# Patient Record
Sex: FEMALE | URBAN - NONMETROPOLITAN AREA
[De-identification: names, ages, dates, MRNs, and addresses within clinical notes are randomized per-mention and may not be internally consistent; named-entity substitution may affect disease eponyms.]

---

## 2020-10-18 ENCOUNTER — HISTORICAL (OUTPATIENT)
Dept: ADMINISTRATIVE | Facility: HOSPITAL | Age: 20
End: 2020-10-18

## 2020-11-05 LAB
BILIRUB SERPL-MCNC: NORMAL MG/DL
BLOOD URINE, POC: NORMAL
CLARITY, POC UA: NORMAL
COLOR, POC UA: NORMAL
GLUCOSE UR QL STRIP: NEGATIVE
KETONES UR QL STRIP: NORMAL
LEUKOCYTE EST, POC UA: NEGATIVE
NITRITE, POC UA: NEGATIVE
PH, POC UA: 6
POC BETA-HCG (QUAL): NEGATIVE
PROTEIN, POC: NEGATIVE
SPECIFIC GRAVITY, POC UA: NORMAL
UROBILINOGEN, POC UA: NORMAL

## 2021-04-12 ENCOUNTER — HISTORICAL (OUTPATIENT)
Dept: ADMINISTRATIVE | Facility: HOSPITAL | Age: 21
End: 2021-04-12

## 2022-04-11 ENCOUNTER — HISTORICAL (OUTPATIENT)
Dept: ADMINISTRATIVE | Facility: HOSPITAL | Age: 22
End: 2022-04-11

## 2022-04-27 VITALS
WEIGHT: 202.81 LBS | HEIGHT: 68 IN | BODY MASS INDEX: 30.74 KG/M2 | DIASTOLIC BLOOD PRESSURE: 80 MMHG | SYSTOLIC BLOOD PRESSURE: 122 MMHG

## 2022-09-22 ENCOUNTER — HISTORICAL (OUTPATIENT)
Dept: ADMINISTRATIVE | Facility: HOSPITAL | Age: 22
End: 2022-09-22

## 2024-07-17 ENCOUNTER — OFFICE VISIT (OUTPATIENT)
Dept: OBSTETRICS AND GYNECOLOGY | Facility: CLINIC | Age: 24
End: 2024-07-17
Payer: MEDICAID

## 2024-07-17 VITALS
SYSTOLIC BLOOD PRESSURE: 120 MMHG | WEIGHT: 211.88 LBS | DIASTOLIC BLOOD PRESSURE: 68 MMHG | BODY MASS INDEX: 33.26 KG/M2 | HEIGHT: 67 IN

## 2024-07-17 DIAGNOSIS — Z3A.24 24 WEEKS GESTATION OF PREGNANCY: ICD-10-CM

## 2024-07-17 DIAGNOSIS — Z12.4 CERVICAL CANCER SCREENING: ICD-10-CM

## 2024-07-17 DIAGNOSIS — Z11.3 ROUTINE SCREENING FOR STI (SEXUALLY TRANSMITTED INFECTION): ICD-10-CM

## 2024-07-17 DIAGNOSIS — Z34.00 INITIAL OBSTETRIC VISIT, ANTEPARTUM: Primary | ICD-10-CM

## 2024-07-17 DIAGNOSIS — O99.210 OBESITY AFFECTING PREGNANCY, ANTEPARTUM, UNSPECIFIED OBESITY TYPE: ICD-10-CM

## 2024-07-17 PROCEDURE — 87591 N.GONORRHOEAE DNA AMP PROB: CPT | Performed by: OBSTETRICS & GYNECOLOGY

## 2024-07-17 PROCEDURE — 87661 TRICHOMONAS VAGINALIS AMPLIF: CPT | Performed by: OBSTETRICS & GYNECOLOGY

## 2024-07-17 PROCEDURE — 87491 CHLMYD TRACH DNA AMP PROBE: CPT | Performed by: OBSTETRICS & GYNECOLOGY

## 2024-07-17 NOTE — PROGRESS NOTES
Chief Complaint:  UPT confirmation (LMP Unknown, states she is 26 wks. [facility] [place], States she forgot paperwork at home. )    History of Present Illness:  Anastasiia Altman is a 24 y.o. year old  presents for her new ob, transferring from [place].  Patient says she was seen at a [facility] in [place] but no labs were performed during her prenatal care.  Reports was told YANELI 24 then changed, records not available today. Denies complications thus far.     Gyn History:    Menstrual History   Cycle: Yes  Menarche Age: 13 years  Flow Duration: 7  Flow: (!) Heavy  Interval: 28  Intermenstrual Bleeding: No  Dysmenorrhea: Yes  Dysmenorrhea Severity : Mild  [place]  Sexually Active: Yes  Sexual Orientation: heterosexual  Postcoital Bleeding: No  Dyspareunia: No  STI History: Yes  STI Type: Chlamydia  Contraception: No  Menopause  Menopause Age: 1 years  Breast History  Last Breast Imaging Date: No  History of Breast Biopsy: No  Pap History   Last pap date:  (PAP @ [facility] PAP @ age 23)  Result: Normal  History of Abnormal Pap: No  HPV Vaccine Completed: Yes  HPV Vaccine Injection Type: 3 Injection Series        Review of Systems:  General/Constitutional: Chills denies. Fatigue/weakness denies. Fever denies. Night sweats denies. Hot flashes denies.   Respiratory: Cough denies. Hemoptysis denies. SOB denies. Sputum production denies. Wheezing denies.   Cardiovascular: Chest pain denies. Dizziness denies. Palpitations denies. Swelling in hands/feet denies.    Gastrointestinal: Abdominal pain denies. Blood in stool denies. Constipation denies. Diarrhea denies. Heartburn denies. Nausea denies. Vomiting denies.   Genitourinary: Incontinence denies. Blood in urine denies. Frequent urination denies. Painful urination denies.  Urinary urgency denies.  Nocturia denies.   Gynecologic: Irregular menses denies. Heavy bleeding denies. Painful menses denies. Vaginal discharge denies. Vaginal  "odor denies. Vaginal itching denies. Vaginal lesion denies.  Pelvic pain denies. Decreased libido denies. Vulvar lesion denies. Prolapse of genital organs denies. Painful intercourse denies. Postcoital bleeding denies.   Psychiatric: Depression denies. Anxiety denies.     OB History    Para Term  AB Living   1 0 0 0 0 0   SAB IAB Ectopic Multiple Live Births   0 0 0 0 0      # 1 - Date: None, Sex: None, Weight: None, GA: None, Type: None, Apgar1: None, Apgar5: None, Living: None, Birth Comments: None      History reviewed. No pertinent past medical history.  No current outpatient medications on file.    Review of patient's allergies indicates:   Allergen Reactions    Iodine Nausea And Vomiting    Penicillin Nausea And Vomiting       History reviewed. No pertinent surgical history.  Family History   Problem Relation Name Age of Onset    Ovarian cancer Paternal Aunt      Breast cancer Paternal Aunt      Uterine cancer Neg Hx      Cervical cancer Neg Hx      Colon cancer Neg Hx       Social History     Socioeconomic History    Marital status: Single   Tobacco Use    Smoking status: Some Days     Types: Vaping with nicotine    Smokeless tobacco: Never   Substance and Sexual Activity    Alcohol use: Not Currently    Drug use: Not Currently     Types: Marijuana    Sexual activity: Yes     Partners: Male     Birth control/protection: None       Physical Exam:  /68   Ht 5' 7" (1.702 m)   Wt 96.1 kg (211 lb 13.8 oz)   BMI 33.18 kg/m²     General Exam:    General Appearance: alert, in no acute distress, normal, well nourished.  Psych:  Orientation: time, place, person.  Mood/Affect: Normal         OB US:   BPD: 23w4d  HC: 24w2d  AC: 23w5d  FL: 25w3d  EFW: 686g, 15%tile    BPP:   BEBETO: 10.9    YANELI 24 by today's US      Assessment/Plan:  1. Initial obstetric visit, antepartum  -     US OB/GYN Procedure ([facility]) - Extended List - Future; Future    2. 24 weeks gestation of pregnancy  -     US " OB/GYN Procedure ([facility]) - Extended List - Future; Future  -     Cell-Free DNA Prenatal Screen; Future; Expected date: 07/17/2024  -     US OB 14+ Wks, TransAbd, Single Gestation; Future; Expected date: 07/18/2024  -     GTT 1 Hour; Future; Expected date: 07/17/2024  -     Hemoglobin Electrophoresis Evaluation, Blood; Future; Expected date: 07/17/2024  -     Rubella Antibody, IgG; Future; Expected date: 07/17/2024  -     Varicella Zoster Antibody, IgG; Future; Expected date: 07/17/2024  -     HIV 1/2 Ag/Ab (4th Gen); Future; Expected date: 07/17/2024  -     SYPHILIS ANTIBODY (WITH REFLEX RPR); Future; Expected date: 07/17/2024  -     Hepatitis C Antibody; Future; Expected date: 07/17/2024  -     Hepatitis B Surface Antigen; Future; Expected date: 07/17/2024  -     Type & Screen; Future; Expected date: 07/17/2024  -     CBC Auto Differential; Future; Expected date: 07/17/2024    3. Cervical cancer screening  -     Liquid-Based Pap Smear, Screening    4. Routine screening for STI (sexually transmitted infection)  -     Liquid-Based Pap Smear, Screening    5. Obesity affecting pregnancy, antepartum, unspecified obesity type  -     GTT 1 Hour; Future; Expected date: 07/17/2024           Prenatal counseling  Discussed appropriate weight gain for pregnancy  Tobacco avoidance/cessation  Illicit drug avoidance  PNL  PNV, folic acid     GC/CZ/TV     VALDO for records     O'KENNEDY   ANATOMY SCAN   CFDNA, CF     RTC 4 weeks.      This note was transcribed by Snow Amaya. There may be transcription errors as a result, however minimal. Effort has been made to ensure accuracy of transcription, but any obvious errors or omissions should be clarified with the author of the document.

## 2024-07-18 PROBLEM — O99.210 OBESITY AFFECTING PREGNANCY, ANTEPARTUM: Status: ACTIVE | Noted: 2024-07-18

## 2024-07-19 ENCOUNTER — HOSPITAL ENCOUNTER (OUTPATIENT)
Dept: RADIOLOGY | Facility: HOSPITAL | Age: 24
Discharge: HOME | End: 2024-07-19
Attending: OBSTETRICS & GYNECOLOGY
Payer: MEDICAID

## 2024-07-19 DIAGNOSIS — Z3A.24 24 WEEKS GESTATION OF PREGNANCY: ICD-10-CM

## 2024-07-19 PROCEDURE — 76805 OB US >/= 14 WKS SNGL FETUS: CPT | Mod: TC

## 2024-07-22 ENCOUNTER — TELEPHONE (OUTPATIENT)
Dept: OBSTETRICS AND GYNECOLOGY | Facility: CLINIC | Age: 24
End: 2024-07-22
Payer: MEDICAID

## 2024-07-22 NOTE — TELEPHONE ENCOUNTER
Sent in referral to Dr Mcdonald for early nuchal cord noted on anatomy scan. Called and informed pt about the referral being sent and for pt to be expecting a call from them to schedule appt. Pt verbalized understanding

## 2024-07-22 NOTE — TELEPHONE ENCOUNTER
----- Message from Ronaldo Roper MD sent at 7/22/2024  9:23 AM CDT -----  Refer to MFM for early nuchal cord noted on anatomy US

## 2024-07-23 ENCOUNTER — TELEPHONE (OUTPATIENT)
Dept: OBSTETRICS AND GYNECOLOGY | Facility: CLINIC | Age: 24
End: 2024-07-23
Payer: MEDICAID

## 2024-07-23 ENCOUNTER — TELEPHONE (OUTPATIENT)
Dept: MATERNAL FETAL MEDICINE | Facility: CLINIC | Age: 24
End: 2024-07-23
Payer: MEDICAID

## 2024-07-23 LAB
CHLAMYDIA TRACHOMATIS: NEGATIVE
NEISSERIA GONORRHOEAE: NEGATIVE
PSYCHE PATHOLOGY RESULT: NORMAL
TRICHOMONAS VAGINALIS: POSITIVE

## 2024-07-23 NOTE — TELEPHONE ENCOUNTER
----- Message from Brylee Guillory sent at 7/23/2024  2:40 PM CDT -----  Regarding: Pt Advice  Contact: Dr. Mcdonald Office  Dr. Mcdonald office called stating that they called the patient to let her know that they received a referral for her. The office said that they patient started hysterically crying over the phone stating that she has no idea why she is having to see them and that she thought her baby was healthy. Patient call back number [phone]

## 2024-07-23 NOTE — TELEPHONE ENCOUNTER
Placed call to pt to notify referral was sent for early nuchal cord noted on anatomy US. Informed pt that referral is in system and MFM should be reaching out soon to schedule appt. Pt may reach out to MFM regarding appt if desires. Questions answered to the best of my ability. Offered appt for questions/concerns with our office, to call if desires. Voiced understanding.

## 2024-07-25 ENCOUNTER — TELEPHONE (OUTPATIENT)
Dept: OBSTETRICS AND GYNECOLOGY | Facility: CLINIC | Age: 24
End: 2024-07-25
Payer: MEDICAID

## 2024-07-25 DIAGNOSIS — A59.9 TRICHOMONAS INFECTION: Primary | ICD-10-CM

## 2024-07-25 RX ORDER — METRONIDAZOLE 500 MG/1
500 TABLET ORAL 2 TIMES DAILY
Qty: 14 TABLET | Refills: 0 | Status: SHIPPED | OUTPATIENT
Start: 2024-07-25 | End: 2024-08-01

## 2024-07-25 NOTE — TELEPHONE ENCOUNTER
Spoke with pt and informed of +TV results and per Dr Jacquelin Stiles to be sent to pharmacy. Pt have an appt on 8/19/2023 and NED can be done then. Pt also states that we can treat her partner as well. I informed pt that I will call in pt prescription to [facility] [address] in [place]. Pt verbalized understanding

## 2024-07-29 DIAGNOSIS — R93.89 ABNORMAL ULTRASOUND: ICD-10-CM

## 2024-07-29 DIAGNOSIS — Z36.89 ENCOUNTER FOR FETAL ANATOMIC SURVEY: ICD-10-CM

## 2024-07-31 ENCOUNTER — PROCEDURE VISIT (OUTPATIENT)
Dept: MATERNAL FETAL MEDICINE | Facility: CLINIC | Age: 24
End: 2024-07-31
Payer: MEDICAID

## 2024-07-31 ENCOUNTER — OFFICE VISIT (OUTPATIENT)
Dept: MATERNAL FETAL MEDICINE | Facility: CLINIC | Age: 24
End: 2024-07-31
Payer: MEDICAID

## 2024-07-31 VITALS
DIASTOLIC BLOOD PRESSURE: 75 MMHG | WEIGHT: 217.81 LBS | SYSTOLIC BLOOD PRESSURE: 118 MMHG | HEART RATE: 97 BPM | BODY MASS INDEX: 34.19 KG/M2 | HEIGHT: 67 IN

## 2024-07-31 DIAGNOSIS — O28.3 ABNORMAL PRENATAL ULTRASOUND: ICD-10-CM

## 2024-07-31 DIAGNOSIS — R93.89 ABNORMAL ULTRASOUND: ICD-10-CM

## 2024-07-31 DIAGNOSIS — Z36.89 ENCOUNTER FOR FETAL ANATOMIC SURVEY: ICD-10-CM

## 2024-07-31 DIAGNOSIS — O09.90 AT HIGH RISK FOR COMPLICATIONS OF INTRAUTERINE PREGNANCY (IUP): ICD-10-CM

## 2024-07-31 DIAGNOSIS — Z13.31 POSITIVE DEPRESSION SCREENING: ICD-10-CM

## 2024-07-31 DIAGNOSIS — O99.212 OBESITY AFFECTING PREGNANCY IN SECOND TRIMESTER, UNSPECIFIED OBESITY TYPE: ICD-10-CM

## 2024-07-31 RX ORDER — ASPIRIN 81 MG/1
81 TABLET ORAL DAILY
Qty: 30 TABLET | Refills: 3 | Status: SHIPPED | OUTPATIENT
Start: 2024-07-31 | End: 2024-11-28

## 2024-07-31 NOTE — PROGRESS NOTES
Maternal Fetal Medicine New Consult    Subjective:     Patient ID: [id number]    Chief Complaint: mfm consult w/us (Nuchal Cord)    HPI: 24 y.o.  female  at 26w2d gestation with Estimated Date of Delivery: 24 by previously assigned EDC. She is sent for MFM consultation for nuchal cord.     She had early nuchal cord noted on anatomy ultrasound with primary OB.  She started seeing Dr. Roper around 24 weeks as a transfer from [place].  She had increased BMI of 33 at initial OB visit with Dr. Roper.  She had positive depression screen on intake today.  Significant other Christopher present for visit.     She denies any personal or family history of aneuploidy or anomalies. She denies any exposure to high fevers, viral rashes, illicit drugs or alcohol in this pregnancy.  She denies any leaking fluid, vaginal bleeding, contractions, decreased fetal movement. Denies headaches, visual disturbances, or epigastric pain.       Pregnancy complications include:  Patient Active Problem List   Diagnosis    Increased BMI affecting pregnancy in second trimester    Nuchal cord, not applicable or unspecified fetus    At high risk for complications of intrauterine pregnancy (IUP)    Positive depression screening       History reviewed. No pertinent past medical history.    History reviewed. No pertinent surgical history.    Family History   Problem Relation Name Age of Onset    Skin cancer Maternal Grandmother      Ovarian cancer Paternal Aunt      Breast cancer Paternal Aunt      Uterine cancer Neg Hx      Cervical cancer Neg Hx      Colon cancer Neg Hx         Social History     Socioeconomic History    Marital status: Single   Tobacco Use    Smoking status: Some Days     Types: Vaping with nicotine     Start date:     Smokeless tobacco: Never   Substance and Sexual Activity    Alcohol use: Not Currently    Drug use: Not Currently     Types: Marijuana    Sexual activity: Yes     Partners: Male     Birth  "control/protection: None       Current Outpatient Medications   Medication Sig Dispense Refill    metroNIDAZOLE (FLAGYL) 500 MG tablet Take 1 tablet (500 mg total) by mouth 2 (two) times daily. for 7 days 14 tablet 0    prenatal vit/iron fum/folic ac (PRENATAL 1+1 ORAL) Take by mouth.      aspirin (ECOTRIN) 81 MG EC tablet Take 1 tablet (81 mg total) by mouth once daily. 30 tablet 3     No current facility-administered medications for this visit.       Review of patient's allergies indicates:   Allergen Reactions    Iodine Nausea And Vomiting    Penicillin Nausea And Vomiting        Review of Systems   12 point review of systems conducted, negative except as stated in the history of present illness. See HPI for details.      Objective:     Visit Vitals  /75 (BP Location: Left arm, Patient Position: Sitting, BP Method: Large (Automatic))   Pulse 97   Ht 5' 7" (1.702 m)   Wt 98.8 kg (217 lb 12.8 oz)   BMI 34.11 kg/m²        Physical Exam  Vitals and nursing note reviewed.   Constitutional:       General: She is not in acute distress.     Appearance: Normal appearance.      Comments: Increased BMI   HENT:      Head: Normocephalic and atraumatic.      Nose: Nose normal. No congestion.      Mouth/Throat:      Pharynx: Oropharynx is clear.   Eyes:      General: No scleral icterus.     Conjunctiva/sclera: Conjunctivae normal.   Cardiovascular:      Rate and Rhythm: Normal rate and regular rhythm.   Pulmonary:      Effort: No respiratory distress.      Breath sounds: Normal breath sounds. No wheezing.   Abdominal:      General: Abdomen is flat.      Palpations: Abdomen is soft.      Tenderness: There is no abdominal tenderness. There is no right CVA tenderness, left CVA tenderness or guarding.      Comments: No CVA tenderness gravid uterus.    Musculoskeletal:         General: Normal range of motion.      Cervical back: Neck supple.      Right lower leg: No edema.      Left lower leg: No edema.   Skin:     General: " Skin is warm.      Findings: No bruising or rash.   Neurological:      General: No focal deficit present.      Mental Status: She is oriented to person, place, and time.      Deep Tendon Reflexes: Reflexes normal.      Comments: Normal reflexes   Psychiatric:         Mood and Affect: Mood normal.         Behavior: Behavior normal.         Thought Content: Thought content normal.         Judgment: Judgment normal.         Assessment/Plan:     24 y.o.  female with IUP at 26w2d    Incidental finding of Nuchal cord  There is normal fetal growth with an EFW of 883 g at the 40% and the AC at the 19% and no anomalies seen on fetal anatomy scan on 24.  AFV is normal.    Discussed that incidental nuchal cords occur in up to 20% of pregnancies, and do no alter management. [facility] instructions and continued normal pregnancy care is in order. She is to continue routine prenatal care with expectant management to be done. PTL precautions given.    If considering external cephalic version: The incidental nuchal cord may add to the risk of an external version attempt, increasing risk of fetal heart rate decelerations that might require intervention, or a higher failure rate.  If needed later in the pregnancy, recommend to discuss further with Dr. Roper.      Increased BMI in pregnancy  Body mass index is 34.11 kg/m².    I discussed the risk of miscarriage in first trimester, recurrent miscarriages, congenital anomalies, hypertension, diabetes,  labor and the higher risk of  section and the higher risk of fetal demise in-utero. There is also higher risk of for excessive fetal weight and large for gestational age (LGA) fetuses. Mothers with LGA fetuses are at higher risk of prolonged labor,  delivery, shoulder dystocia and birth trauma. LGA neonates are increased risk of fetal hypoxia and intrauterine death, and are at risk to develop diabetes, obesity, metabolic syndrome, asthma and cancer later in life.  She was advised of the importance of eating healthy and limiting weight gain to 11-20 lbs during the pregnancy, as optimal in this situation. I recommended low calorie, low fat diet avoiding any additional excessive weight gain. Excess weight gain would be associated with gestational hypertension, gestational diabetes and adverse  outcomes, including fetal demise in utero.      Importance of working on losing weight after the pregnancy is over, especially before a future pregnancy was discussed. Breastfeeding may be an important tool in reducing the postpartum weight retention. Fetal risks were discussed with short term risk of fetal/ obesity and long term risk of adolescent component of metabolic syndrome.       Preeclampsia prophylaxis  With her risk factors for preeclampsia including  nulliparity, BMI over 30,  ancestry, and low socioeconomic status, discussed recommendations for low dose aspirin use to decrease risks for adverse outcomes, including preeclampsia, low birth weight and  delivery. Low-dose aspirin reduced the risk for preeclampsia by 15% in clinical trials and reduced the risk for  birth by 20% and FGR by 20%, and  mortality by around 20%.  After discussing risks/benefits of its use, it was agreed to start asa 81 mg daily today and continue until delivery.. Also, recommend using in all future pregnancies.    Depression screen positive  With minimal symptoms at this time, and appearing to be situational, she does not desire any medication at this time. If she experiences any SI/HI tendencies, she is to report it immediately to provider/ER for prompt intervention.  She was given 988 packet and encouraged to establish care with mental health provider.    Follow up for NO FU. We will see any time at OB's discretion.       Patient was counseled on prevalence of nuchal cord that most have a benign course course and do well and should be considered an  incidental finding.  Unless other new findings could around managed pregnancy in usual fashion with the patient do fetal kick counts and labor instructions.  Importance of healthy diet reviewed with proper weight gain recommendations to decrease the risk of diabetes and preeclampsia and other adverse pregnancy outcomes.  With screen positive for depression patient was given 988 packet advised to schedule an appointment with mental health provider for evaluation.  She has no suicidal or homicidal ideation at this time.  No follow-up appointment was given at this time.  She will start taking daily aspirin.    Future Appointments   Date Time Provider Department Center   8/19/2024  9:40 AM Ronaldo Roper MD [facility]        Patient was evaluated by DEAN Guillen, and and Dr. Mcdonald.  Final assessment and recommendations as stated above were made by Dr. Mcdonald.    This note was created with the assistance of [employer] voice recognition software. There may be transcription errors as a result of using this technology, however minimal. Effort has been made to ensure accuracy of transcription, but any obvious errors or omissions should be clarified with the author of the document.

## 2024-08-15 NOTE — PROGRESS NOTES
HPI  24 y.o.  at 29w0d here for OB check. NED for +TV.  Reports positive fetal movement. denies LOF, CTX.       ROS  Review of Systems   Constitutional:  Negative for chills and fever.   Gastrointestinal:  Negative for abdominal pain, constipation and diarrhea.   Genitourinary:  Negative for flank pain, genital sores, pelvic pain, urgency, vaginal bleeding, vaginal discharge, vaginal pain, postcoital bleeding and vaginal odor.         OBJECTIVE  /80 (BP Location: Right arm, Patient Position: Sitting, BP Method: Medium (Manual))   Wt 100.2 kg (221 lb)   BMI 34.61 kg/m²     General Exam:    General Appearance: alert, in no acute distress, normal, well nourished.  Psych:  Orientation: time, place, person.  Mood/Affect: Normal   Abdomen:  - Soft. Non-tender. No rebound tenderness or guardingNo masses. Liver normal. Spleen normal. No hernia palpable.  Pelvis:   - Vulva: Normal   - Perianal skin: Normal   - Urethra: Normal meatus. Q-tip: Not performed   - Vagina: NormalVaginal discharge present: . Cystocele: Absent. Rectocele: Absent   - Cervix: Normal. Cervical motion tenderness Absent   - Uterus:  Mobile. Non-tender.   - Adnexal: Normal      FHT: 150  FH: 29 cm    ASSESSMENT  1. Other obesity due to excess calories affecting pregnancy in third trimester    2. 29 weeks gestation of pregnancy  - POCT Urinalysis, Dipstick, Automated, W/O Scope    3. Nuchal cord, not applicable or unspecified fetus    4. At higher risk for preeclampsia complications of intrauterine pregnancy (IUP)    5. Trichomonal vaginitis during pregnancy in third trimester  - Trichomonas vaginalis Amplified RNA        PLAN  Reviewed standard labor unit and kick count precautions.  Discussed pre-eclampsia precautions.  Discussed COVID-19 risks, social distancing, and isolation if respiratory symptoms develop.     MFM note 24;  There is normal fetal growth with an EFW of 883 g at the 40% and the AC at the 19% and no anomalies seen on  fetal anatomy scan on 7/31/24.  AFV is normal.  If considering external cephalic version: The incidental nuchal cord may add to the risk of an external version attempt, increasing risk of fetal heart rate decelerations that might require intervention, or a higher failure rate.  If needed later in the pregnancy, recommend to discuss further with Dr. Roper.  ASA 81mg daily   No further MFM f/u    NED for TV    RTC 2 weeks     This note was transcribed by Snow Amaya. There may be transcription errors as a result, however minimal. Effort has been made to ensure accuracy of transcription, but any obvious errors or omissions should be clarified with the author of the document.

## 2024-08-19 ENCOUNTER — ROUTINE PRENATAL (OUTPATIENT)
Dept: OBSTETRICS AND GYNECOLOGY | Facility: CLINIC | Age: 24
End: 2024-08-19
Payer: MEDICAID

## 2024-08-19 VITALS — DIASTOLIC BLOOD PRESSURE: 80 MMHG | SYSTOLIC BLOOD PRESSURE: 124 MMHG | WEIGHT: 221 LBS | BODY MASS INDEX: 34.61 KG/M2

## 2024-08-19 DIAGNOSIS — Z3A.29 29 WEEKS GESTATION OF PREGNANCY: Primary | ICD-10-CM

## 2024-08-19 DIAGNOSIS — A59.01 TRICHOMONAL VAGINITIS DURING PREGNANCY IN THIRD TRIMESTER: ICD-10-CM

## 2024-08-19 DIAGNOSIS — O23.593 TRICHOMONAL VAGINITIS DURING PREGNANCY IN THIRD TRIMESTER: ICD-10-CM

## 2024-08-19 DIAGNOSIS — O09.90 AT HIGH RISK FOR COMPLICATIONS OF INTRAUTERINE PREGNANCY (IUP): ICD-10-CM

## 2024-08-19 DIAGNOSIS — E66.09 OTHER OBESITY DUE TO EXCESS CALORIES AFFECTING PREGNANCY IN THIRD TRIMESTER: ICD-10-CM

## 2024-08-19 DIAGNOSIS — O99.213 OTHER OBESITY DUE TO EXCESS CALORIES AFFECTING PREGNANCY IN THIRD TRIMESTER: ICD-10-CM

## 2024-08-19 LAB
BILIRUB UR QL STRIP: NORMAL
GLUCOSE UR QL STRIP: NEGATIVE
KETONES UR QL STRIP: NORMAL
LEUKOCYTE ESTERASE UR QL STRIP: NEGATIVE
PH, POC UA: NORMAL
POC BLOOD, URINE: NORMAL
POC NITRATES, URINE: NEGATIVE
PROT UR QL STRIP: NEGATIVE
SP GR UR STRIP: NORMAL (ref 1–1.03)
UROBILINOGEN UR STRIP-ACNC: NORMAL (ref 0.3–2.2)

## 2024-08-19 PROCEDURE — 87661 TRICHOMONAS VAGINALIS AMPLIF: CPT | Performed by: OBSTETRICS & GYNECOLOGY

## 2024-08-20 LAB
SPECIMEN SOURCE: NORMAL
T VAGINALIS RRNA SPEC QL NAA+PROBE: NEGATIVE

## 2024-08-26 ENCOUNTER — PATIENT MESSAGE (OUTPATIENT)
Dept: OTHER | Facility: OTHER | Age: 24
End: 2024-08-26
Payer: MEDICAID

## 2024-08-28 NOTE — PROGRESS NOTES
HPI  24 y.o.  at 31w1d here for OB check.  Reports positive fetal movement. denies LOF, CTX.       ROS  Review of Systems   Constitutional:  Negative for chills and fever.   Gastrointestinal:  Negative for abdominal pain, constipation and diarrhea.   Genitourinary:  Negative for flank pain, genital sores, pelvic pain, urgency, vaginal bleeding, vaginal discharge, vaginal pain, postcoital bleeding and vaginal odor.         OBJECTIVE  /62 (BP Location: Right arm, Patient Position: Sitting, BP Method: Large (Manual))   Wt 102.5 kg (226 lb)   BMI 35.40 kg/m²     Gen: No distress  Abdomen: Gravid, non-tender  Extremities: No edema    FHT: 130s  FH: 34cm    ASSESSMENT  1. 31 weeks gestation of pregnancy  - POCT Urinalysis, Dipstick, Automated, W/O Scope    2. Other obesity due to excess calories affecting pregnancy in third trimester    3. Nuchal cord, not applicable or unspecified fetus    4. Fundal height high for dates        PLAN  Reviewed standard labor unit and kick count precautions.  Discussed pre-eclampsia precautions.  Discussed COVID-19 risks, social distancing, and isolation if respiratory symptoms develop.     NED for TV negative   Growth scan on RTC due to FH     RTC 1 week growth scan      This note was transcribed by Snow Amaya. There may be transcription errors as a result, however minimal. Effort has been made to ensure accuracy of transcription, but any obvious errors or omissions should be clarified with the author of the document.       I agree with the above documentation.

## 2024-09-02 DIAGNOSIS — O09.90 AT HIGH RISK FOR COMPLICATIONS OF INTRAUTERINE PREGNANCY (IUP): Primary | ICD-10-CM

## 2024-09-03 ENCOUNTER — ROUTINE PRENATAL (OUTPATIENT)
Dept: OBSTETRICS AND GYNECOLOGY | Facility: CLINIC | Age: 24
End: 2024-09-03
Payer: MEDICAID

## 2024-09-03 VITALS — DIASTOLIC BLOOD PRESSURE: 62 MMHG | SYSTOLIC BLOOD PRESSURE: 112 MMHG | BODY MASS INDEX: 35.4 KG/M2 | WEIGHT: 226 LBS

## 2024-09-03 DIAGNOSIS — O99.213 OTHER OBESITY DUE TO EXCESS CALORIES AFFECTING PREGNANCY IN THIRD TRIMESTER: ICD-10-CM

## 2024-09-03 DIAGNOSIS — Z3A.31 31 WEEKS GESTATION OF PREGNANCY: Primary | ICD-10-CM

## 2024-09-03 DIAGNOSIS — Z34.90 FUNDAL HEIGHT HIGH FOR DATES: ICD-10-CM

## 2024-09-03 DIAGNOSIS — E66.09 OTHER OBESITY DUE TO EXCESS CALORIES AFFECTING PREGNANCY IN THIRD TRIMESTER: ICD-10-CM

## 2024-09-03 PROCEDURE — 99213 OFFICE O/P EST LOW 20 MIN: CPT | Mod: TH,,, | Performed by: OBSTETRICS & GYNECOLOGY

## 2024-09-03 RX ORDER — ASPIRIN 81 MG/1
81 TABLET ORAL DAILY
Qty: 30 TABLET | Refills: 3 | Status: SHIPPED | OUTPATIENT
Start: 2024-09-03 | End: 2025-01-01

## 2024-09-06 NOTE — PROGRESS NOTES
HPI  24 y.o.  at 32w0d here for OB check.  Reports positive fetal movement. denies LOF, CTX.       ROS  Review of Systems   Constitutional:  Negative for chills and fever.   Gastrointestinal:  Negative for abdominal pain, constipation and diarrhea.   Genitourinary:  Negative for flank pain, genital sores, pelvic pain, urgency, vaginal bleeding, vaginal discharge, vaginal pain, postcoital bleeding and vaginal odor.         OBJECTIVE  /76   Wt 105.2 kg (232 lb)   BMI 36.34 kg/m²     Gen: No distress  Abdomen: Gravid, non-tender  Extremities: No edema    FHT: 128    BPD: 32w0d  HC: 32w5d  AC: 31w4d  FL: 32w3d  EFW: 1893g, 29%tile    BPP:   BEBETO:14.7    Presentation: vertex       ASSESSMENT  1. Fundal height high for dates    2. 32 weeks gestation of pregnancy  - US OB/GYN Procedure ([facility]) - Extended List - Today    3. Other obesity due to excess calories affecting pregnancy in third trimester        PLAN  Reviewed standard labor unit and kick count precautions.  Discussed pre-eclampsia precautions.  Discussed COVID-19 risks, social distancing, and isolation if respiratory symptoms develop.   Appropriate growth.  Normal BPP    RTC 2 wks ob check     This note was transcribed by Snow Amaya. There may be transcription errors as a result, however minimal. Effort has been made to ensure accuracy of transcription, but any obvious errors or omissions should be clarified with the author of the document.       I agree with the above documentation.

## 2024-09-09 ENCOUNTER — ROUTINE PRENATAL (OUTPATIENT)
Dept: OBSTETRICS AND GYNECOLOGY | Facility: CLINIC | Age: 24
End: 2024-09-09
Payer: MEDICAID

## 2024-09-09 ENCOUNTER — PATIENT MESSAGE (OUTPATIENT)
Dept: OTHER | Facility: OTHER | Age: 24
End: 2024-09-09
Payer: MEDICAID

## 2024-09-09 VITALS — BODY MASS INDEX: 36.34 KG/M2 | WEIGHT: 232 LBS | DIASTOLIC BLOOD PRESSURE: 76 MMHG | SYSTOLIC BLOOD PRESSURE: 116 MMHG

## 2024-09-09 DIAGNOSIS — E66.09 OTHER OBESITY DUE TO EXCESS CALORIES AFFECTING PREGNANCY IN THIRD TRIMESTER: ICD-10-CM

## 2024-09-09 DIAGNOSIS — O99.213 OTHER OBESITY DUE TO EXCESS CALORIES AFFECTING PREGNANCY IN THIRD TRIMESTER: ICD-10-CM

## 2024-09-09 DIAGNOSIS — Z34.90 FUNDAL HEIGHT HIGH FOR DATES: ICD-10-CM

## 2024-09-09 DIAGNOSIS — Z3A.32 32 WEEKS GESTATION OF PREGNANCY: Primary | ICD-10-CM

## 2024-09-20 NOTE — PROGRESS NOTES
HPI  24 y.o.  at 34w1d here for OB check.  Reports positive fetal movement. denies LOF, CTX.       ROS  Review of Systems   Constitutional:  Negative for chills and fever.   Gastrointestinal:  Negative for abdominal pain, constipation and diarrhea.   Genitourinary:  Negative for flank pain, genital sores, pelvic pain, urgency, vaginal bleeding, vaginal discharge, vaginal pain, postcoital bleeding and vaginal odor.         OBJECTIVE  /72 (BP Location: Right arm, Patient Position: Sitting, BP Method: Large (Manual))   Wt 106.5 kg (234 lb 12.8 oz)   BMI 36.77 kg/m²     Gen: No distress  Abdomen: Gravid, non-tender  Extremities: No edema    FHT: 160  FH: 35    ASSESSMENT  1. Other obesity due to excess calories affecting pregnancy in third trimester    2. 34 weeks gestation of pregnancy  - POCT Urinalysis, Dipstick, Automated, W/O Scope        PLAN  Reviewed standard labor unit and kick count precautions.  Discussed pre-eclampsia precautions.  Discussed COVID-19 risks, social distancing, and isolation if respiratory symptoms develop.     Pre admit on RTC 36 wks  Growth US on RTC as well    RTC 2 wks ob check     This note was transcribed by Snow Amaya. There may be transcription errors as a result, however minimal. Effort has been made to ensure accuracy of transcription, but any obvious errors or omissions should be clarified with the author of the document.       I agree with the above documentation.

## 2024-09-24 ENCOUNTER — ROUTINE PRENATAL (OUTPATIENT)
Dept: OBSTETRICS AND GYNECOLOGY | Facility: CLINIC | Age: 24
End: 2024-09-24
Payer: MEDICAID

## 2024-09-24 VITALS
WEIGHT: 234.81 LBS | DIASTOLIC BLOOD PRESSURE: 72 MMHG | BODY MASS INDEX: 36.77 KG/M2 | SYSTOLIC BLOOD PRESSURE: 120 MMHG

## 2024-09-24 DIAGNOSIS — O99.213 OTHER OBESITY DUE TO EXCESS CALORIES AFFECTING PREGNANCY IN THIRD TRIMESTER: Primary | ICD-10-CM

## 2024-09-24 DIAGNOSIS — Z3A.34 34 WEEKS GESTATION OF PREGNANCY: ICD-10-CM

## 2024-09-24 DIAGNOSIS — E66.09 OTHER OBESITY DUE TO EXCESS CALORIES AFFECTING PREGNANCY IN THIRD TRIMESTER: Primary | ICD-10-CM

## 2024-09-24 PROCEDURE — 99213 OFFICE O/P EST LOW 20 MIN: CPT | Mod: TH,,, | Performed by: OBSTETRICS & GYNECOLOGY

## 2024-09-30 ENCOUNTER — PATIENT MESSAGE (OUTPATIENT)
Dept: OTHER | Facility: OTHER | Age: 24
End: 2024-09-30
Payer: MEDICAID

## 2024-10-03 NOTE — PROGRESS NOTES
History & Physical    Subjective     History of Present Illness:  Patient is a 24 y.o. female  at 36w1d here today ob pre admit, growth scan. Positive fetal movement. Denies LOF, CTX.     Chief Complaint   Patient presents with    Routine Prenatal Visit     Pre admit and growth scan.        Review of patient's allergies indicates:   Allergen Reactions    Iodine Nausea And Vomiting    Penicillin Nausea And Vomiting       Current Outpatient Medications   Medication Sig Dispense Refill    aspirin (ECOTRIN) 81 MG EC tablet Take 1 tablet (81 mg total) by mouth once daily. 30 tablet 3    prenatal vit/iron fum/folic ac (PRENATAL 1+1 ORAL) Take by mouth.       No current facility-administered medications for this visit.       Past Medical History:   Diagnosis Date    Anxiety 2016    Depression 2016     History reviewed. No pertinent surgical history.  Family History   Problem Relation Name Age of Onset    Skin cancer Maternal Grandmother Fiordaliza [place]     Cancer Maternal Grandmother Fiordaliza Son         Skin cancer    Diabetes Maternal Grandmother Fiordaliza Cline     Rashes / Skin problems Maternal Grandmother Fiordaliza Cline     Ovarian cancer Paternal Aunt Marilyn Grijalva     Breast cancer Paternal Aunt Marilyn Grijalva     Diabetes Maternal Aunt Janice Quebedoux     Uterine cancer Neg Hx      Cervical cancer Neg Hx      Colon cancer Neg Hx       Social History     Tobacco Use    Smoking status: Some Days     Types: Vaping with nicotine     Start date:     Smokeless tobacco: Never   Substance Use Topics    Alcohol use: Not Currently    Drug use: Not Currently     Types: Marijuana        Review of Systems:  Review of Systems   Respiratory: Negative.     Cardiovascular: Negative.    Gastrointestinal: Negative.    Genitourinary: Negative.           Objective     Vital Signs (Most Recent)  BP: 122/78 (10/08/24 0852)     108 kg (238 lb)     Physical Exam:  Physical Exam    Gen: No distress  Abdomen:  Gravid, non-tender  Extremities: No edema  Pelvis: NEFG  Cervix: 0/0/-3  FHT: 128    BPD: 35w5d  HC: 38w3d  AC: 37w3d  FL: 37w6d  EFW: 3212g, 66%tile    BPP: 8/8  BEBETO: 22.4cm       Assessment and Plan   1. Other obesity due to excess calories affecting pregnancy in third trimester    2. 36 weeks gestation of pregnancy  - C.trach/N.gonor AMP RNA; Future  - Trichomonas vaginalis Amplified RNA; Future  - POCT Urinalysis, Dipstick, Automated, W/O Scope  - Strep Group B by PCR; Future  - US OB/GYN Procedure ([facility]) - Extended List  - Strep Group B by PCR  - Trichomonas vaginalis Amplified RNA  - C.trach/N.gonor AMP RNA    3. Fundal height high for dates        PLAN:    Reviewed standard labor unit and kick count precautions.  Discussed pre-eclampsia precautions.  Discussed COVID-19 risks, social distancing, and isolation if respiratory symptoms develop.     Discussed delivery process and plan.  Consent given for delivery.   Discussed labor unit, kick count, pre-eclampsia, rupture   membrane precautions.   GBS  GC CZ TV collected on urine     Will begin weekly fetal monitoring on RTC    RTC 1 wk NST    This note was transcribed by Snow Amaya. There may be transcription errors as a result, however minimal. Effort has been made to ensure accuracy of transcription, but any obvious errors or omissions should be clarified with the author of the document.       I agree with the above documentation.

## 2024-10-03 NOTE — H&P (VIEW-ONLY)
History & Physical    Subjective     History of Present Illness:  Patient is a 24 y.o. female  at 36w1d here today ob pre admit, growth scan. Positive fetal movement. Denies LOF, CTX.     Chief Complaint   Patient presents with    Routine Prenatal Visit     Pre admit and growth scan.        Review of patient's allergies indicates:   Allergen Reactions    Iodine Nausea And Vomiting    Penicillin Nausea And Vomiting       Current Outpatient Medications   Medication Sig Dispense Refill    aspirin (ECOTRIN) 81 MG EC tablet Take 1 tablet (81 mg total) by mouth once daily. 30 tablet 3    prenatal vit/iron fum/folic ac (PRENATAL 1+1 ORAL) Take by mouth.       No current facility-administered medications for this visit.       Past Medical History:   Diagnosis Date    Anxiety 2016    Depression 2016     History reviewed. No pertinent surgical history.  Family History   Problem Relation Name Age of Onset    Skin cancer Maternal Grandmother Fiordaliza Kelley     Cancer Maternal Grandmother Fiordaliza Son         Skin cancer    Diabetes Maternal Grandmother Fiordaliza Cline     Rashes / Skin problems Maternal Grandmother Fiordaliza Cline     Ovarian cancer Paternal Aunt Marilyn Grijalva     Breast cancer Paternal Aunt Marilyn Grijalva     Diabetes Maternal Aunt Janice Quebedoux     Uterine cancer Neg Hx      Cervical cancer Neg Hx      Colon cancer Neg Hx       Social History     Tobacco Use    Smoking status: Some Days     Types: Vaping with nicotine     Start date:     Smokeless tobacco: Never   Substance Use Topics    Alcohol use: Not Currently    Drug use: Not Currently     Types: Marijuana        Review of Systems:  Review of Systems   Respiratory: Negative.     Cardiovascular: Negative.    Gastrointestinal: Negative.    Genitourinary: Negative.           Objective     Vital Signs (Most Recent)  BP: 122/78 (10/08/24 0852)     108 kg (238 lb)     Physical Exam:  Physical Exam    Gen: No distress  Abdomen:  Gravid, non-tender  Extremities: No edema  Pelvis: NEFG  Cervix: 0/0/-3  FHT: 128    BPD: 35w5d  HC: 38w3d  AC: 37w3d  FL: 37w6d  EFW: 3212g, 66%tile    BPP: 8/8  BEBETO: 22.4cm       Assessment and Plan   1. Other obesity due to excess calories affecting pregnancy in third trimester    2. 36 weeks gestation of pregnancy  - C.trach/N.gonor AMP RNA; Future  - Trichomonas vaginalis Amplified RNA; Future  - POCT Urinalysis, Dipstick, Automated, W/O Scope  - Strep Group B by PCR; Future  - US OB/GYN Procedure ([facility]) - Extended List  - Strep Group B by PCR  - Trichomonas vaginalis Amplified RNA  - C.trach/N.gonor AMP RNA    3. Fundal height high for dates        PLAN:    Reviewed standard labor unit and kick count precautions.  Discussed pre-eclampsia precautions.  Discussed COVID-19 risks, social distancing, and isolation if respiratory symptoms develop.     Discussed delivery process and plan.  Consent given for delivery.   Discussed labor unit, kick count, pre-eclampsia, rupture   membrane precautions.   GBS  GC CZ TV collected on urine     Will begin weekly fetal monitoring on RTC    RTC 1 wk NST    This note was transcribed by Snow Amaya. There may be transcription errors as a result, however minimal. Effort has been made to ensure accuracy of transcription, but any obvious errors or omissions should be clarified with the author of the document.       I agree with the above documentation.

## 2024-10-08 ENCOUNTER — ROUTINE PRENATAL (OUTPATIENT)
Dept: OBSTETRICS AND GYNECOLOGY | Facility: CLINIC | Age: 24
End: 2024-10-08
Payer: MEDICAID

## 2024-10-08 VITALS — BODY MASS INDEX: 37.28 KG/M2 | SYSTOLIC BLOOD PRESSURE: 122 MMHG | DIASTOLIC BLOOD PRESSURE: 78 MMHG | WEIGHT: 238 LBS

## 2024-10-08 DIAGNOSIS — Z3A.36 36 WEEKS GESTATION OF PREGNANCY: ICD-10-CM

## 2024-10-08 DIAGNOSIS — E66.09 OTHER OBESITY DUE TO EXCESS CALORIES AFFECTING PREGNANCY IN THIRD TRIMESTER: Primary | ICD-10-CM

## 2024-10-08 DIAGNOSIS — O99.213 OTHER OBESITY DUE TO EXCESS CALORIES AFFECTING PREGNANCY IN THIRD TRIMESTER: Primary | ICD-10-CM

## 2024-10-08 DIAGNOSIS — Z34.90 FUNDAL HEIGHT HIGH FOR DATES: ICD-10-CM

## 2024-10-08 PROCEDURE — 87591 N.GONORRHOEAE DNA AMP PROB: CPT | Performed by: OBSTETRICS & GYNECOLOGY

## 2024-10-08 PROCEDURE — 87661 TRICHOMONAS VAGINALIS AMPLIF: CPT | Performed by: OBSTETRICS & GYNECOLOGY

## 2024-10-08 PROCEDURE — 87653 STREP B DNA AMP PROBE: CPT | Performed by: OBSTETRICS & GYNECOLOGY

## 2024-10-08 RX ORDER — OXYTOCIN-SODIUM CHLORIDE 0.9% IV SOLN 30 UNIT/500ML 30-0.9/5 UT/ML-%
10 SOLUTION INTRAVENOUS ONCE AS NEEDED
OUTPATIENT
Start: 2024-10-08 | End: 2036-03-06

## 2024-10-08 RX ORDER — CLINDAMYCIN PHOSPHATE 900 MG/50ML
900 INJECTION, SOLUTION INTRAVENOUS
OUTPATIENT
Start: 2024-10-08

## 2024-10-08 RX ORDER — LIDOCAINE HYDROCHLORIDE 10 MG/ML
10 INJECTION, SOLUTION INFILTRATION; PERINEURAL ONCE AS NEEDED
OUTPATIENT
Start: 2024-10-08 | End: 2036-03-06

## 2024-10-08 RX ORDER — CARBOPROST TROMETHAMINE 250 UG/ML
250 INJECTION, SOLUTION INTRAMUSCULAR
OUTPATIENT
Start: 2024-10-08

## 2024-10-08 RX ORDER — CLINDAMYCIN PHOSPHATE 900 MG/50ML
900 INJECTION, SOLUTION INTRAVENOUS ONCE AS NEEDED
OUTPATIENT
Start: 2024-10-08 | End: 2036-03-06

## 2024-10-08 RX ORDER — MISOPROSTOL 100 UG/1
800 TABLET ORAL ONCE AS NEEDED
OUTPATIENT
Start: 2024-10-08 | End: 2036-03-06

## 2024-10-08 RX ORDER — OXYTOCIN-SODIUM CHLORIDE 0.9% IV SOLN 30 UNIT/500ML 30-0.9/5 UT/ML-%
30 SOLUTION INTRAVENOUS ONCE AS NEEDED
OUTPATIENT
Start: 2024-10-08 | End: 2036-03-06

## 2024-10-08 RX ORDER — TRANEXAMIC ACID 10 MG/ML
1000 INJECTION, SOLUTION INTRAVENOUS EVERY 30 MIN PRN
OUTPATIENT
Start: 2024-10-08

## 2024-10-08 RX ORDER — SODIUM CHLORIDE, SODIUM LACTATE, POTASSIUM CHLORIDE, CALCIUM CHLORIDE 600; 310; 30; 20 MG/100ML; MG/100ML; MG/100ML; MG/100ML
INJECTION, SOLUTION INTRAVENOUS CONTINUOUS
OUTPATIENT
Start: 2024-10-08

## 2024-10-08 RX ORDER — METHYLERGONOVINE MALEATE 0.2 MG/ML
200 INJECTION INTRAVENOUS ONCE AS NEEDED
OUTPATIENT
Start: 2024-10-08 | End: 2036-03-06

## 2024-10-08 RX ORDER — MISOPROSTOL 100 MCG
25 TABLET ORAL EVERY 4 HOURS PRN
OUTPATIENT
Start: 2024-10-08

## 2024-10-08 RX ORDER — ONDANSETRON 4 MG/1
8 TABLET, ORALLY DISINTEGRATING ORAL EVERY 8 HOURS PRN
OUTPATIENT
Start: 2024-10-08

## 2024-10-08 RX ORDER — OXYTOCIN-SODIUM CHLORIDE 0.9% IV SOLN 30 UNIT/500ML 30-0.9/5 UT/ML-%
95 SOLUTION INTRAVENOUS ONCE AS NEEDED
OUTPATIENT
Start: 2024-10-08 | End: 2036-03-06

## 2024-10-08 RX ORDER — MISOPROSTOL 100 UG/1
800 TABLET ORAL ONCE AS NEEDED
OUTPATIENT
Start: 2024-10-08

## 2024-10-08 RX ORDER — BUTORPHANOL TARTRATE 1 MG/ML
1 INJECTION INTRAMUSCULAR; INTRAVENOUS
OUTPATIENT
Start: 2024-10-08

## 2024-10-08 RX ORDER — SIMETHICONE 80 MG
1 TABLET,CHEWABLE ORAL 4 TIMES DAILY PRN
OUTPATIENT
Start: 2024-10-08

## 2024-10-08 RX ORDER — CALCIUM CARBONATE 200(500)MG
500 TABLET,CHEWABLE ORAL 3 TIMES DAILY PRN
OUTPATIENT
Start: 2024-10-08

## 2024-10-08 RX ORDER — TERBUTALINE SULFATE 1 MG/ML
0.25 INJECTION SUBCUTANEOUS
OUTPATIENT
Start: 2024-10-08

## 2024-10-08 RX ORDER — ACETAMINOPHEN 325 MG/1
650 TABLET ORAL EVERY 6 HOURS PRN
OUTPATIENT
Start: 2024-10-08

## 2024-10-08 RX ORDER — OXYTOCIN 10 [USP'U]/ML
10 INJECTION, SOLUTION INTRAMUSCULAR; INTRAVENOUS ONCE AS NEEDED
OUTPATIENT
Start: 2024-10-08 | End: 2036-03-06

## 2024-10-08 RX ORDER — OXYTOCIN-SODIUM CHLORIDE 0.9% IV SOLN 30 UNIT/500ML 30-0.9/5 UT/ML-%
95 SOLUTION INTRAVENOUS CONTINUOUS PRN
OUTPATIENT
Start: 2024-10-08

## 2024-10-08 RX ORDER — CEFAZOLIN SODIUM 2 G/50ML
2 SOLUTION INTRAVENOUS ONCE AS NEEDED
OUTPATIENT
Start: 2024-10-08 | End: 2036-03-06

## 2024-10-08 RX ORDER — BUTORPHANOL TARTRATE 2 MG/ML
2 INJECTION INTRAMUSCULAR; INTRAVENOUS
OUTPATIENT
Start: 2024-10-08

## 2024-10-08 RX ORDER — MUPIROCIN 20 MG/G
OINTMENT TOPICAL
OUTPATIENT
Start: 2024-10-08

## 2024-10-08 RX ORDER — SODIUM CHLORIDE 9 MG/ML
INJECTION, SOLUTION INTRAVENOUS
OUTPATIENT
Start: 2024-10-08

## 2024-10-08 RX ORDER — DIPHENOXYLATE HYDROCHLORIDE AND ATROPINE SULFATE 2.5; .025 MG/1; MG/1
2 TABLET ORAL EVERY 6 HOURS PRN
OUTPATIENT
Start: 2024-10-08

## 2024-10-09 LAB
C TRACH RRNA SPEC QL NAA+PROBE: NEGATIVE
N GONORRHOEA RRNA SPEC QL NAA+PROBE: NEGATIVE
SPECIMEN SOURCE: NORMAL
T VAGINALIS RRNA SPEC QL NAA+PROBE: NEGATIVE

## 2024-10-10 ENCOUNTER — TELEPHONE (OUTPATIENT)
Dept: OBSTETRICS AND GYNECOLOGY | Facility: CLINIC | Age: 24
End: 2024-10-10
Payer: MEDICAID

## 2024-10-10 DIAGNOSIS — Z3A.36 36 WEEKS GESTATION OF PREGNANCY: Primary | ICD-10-CM

## 2024-10-10 LAB — STREP B PCR (OHS): DETECTED

## 2024-10-10 NOTE — PROGRESS NOTES
HPI  24 y.o.  at 37w0d here for OB check.  Reports positive fetal movement. denies LOF, CTX.       ROS  Review of Systems   Constitutional:  Negative for chills and fever.   Gastrointestinal:  Negative for abdominal pain, constipation and diarrhea.   Genitourinary:  Negative for flank pain, genital sores, pelvic pain, urgency, vaginal bleeding, vaginal discharge, vaginal pain, postcoital bleeding and vaginal odor.         OBJECTIVE  /74   Wt 110.1 kg (242 lb 12.8 oz)   BMI 38.03 kg/m²     Gen: No distress  Abdomen: Gravid, non-tender  Extremities: No edema  Cervix: 0/0-3  NST:   - Baseline: 130  - Variability: moderate  - Accelerations: PRESENT  - Decelerations: ABSENT  - Time on monitor: 20 mins  - Category: .reactive, category 1    [place]:   - CTX: ABSENT    ASSESSMENT  1. Other obesity due to excess calories affecting pregnancy in third trimester  - Fetal non-stress test- Today    2. 37 weeks gestation of pregnancy  - Fetal non-stress test- Today  - POCT Urinalysis, Dipstick, Automated, W/O Scope    3. Fundal height high for dates  - Fetal non-stress test- Today        PLAN  Reviewed standard labor unit and kick count precautions.  Discussed pre-eclampsia precautions.  Discussed COVID-19 risks, social distancing, and isolation if respiratory symptoms develop.     Cont weekly fetal monitoring   PO hydration     RTC 1 wk NST    This note was transcribed by Snow Amaya. There may be transcription errors as a result, however minimal. Effort has been made to ensure accuracy of transcription, but any obvious errors or omissions should be clarified with the author of the document.       I agree with the above documentation.

## 2024-10-10 NOTE — TELEPHONE ENCOUNTER
Fariha with [facility] placed call to office stating positive GBS with PCN allergy. Requesting order to perform sensitivity. States GBS by PCR should not have been ordered, checked with other nurses in clinic, states correct order was placed. She states she will reach out to [facility] in regards to order that needs to be placed. Informed her to call our office with correct order so we can educate our staff. Voiced understanding.

## 2024-10-14 ENCOUNTER — CLINICAL SUPPORT (OUTPATIENT)
Dept: OBSTETRICS AND GYNECOLOGY | Facility: CLINIC | Age: 24
End: 2024-10-14
Payer: MEDICAID

## 2024-10-14 VITALS
BODY MASS INDEX: 38.03 KG/M2 | SYSTOLIC BLOOD PRESSURE: 120 MMHG | WEIGHT: 242.81 LBS | DIASTOLIC BLOOD PRESSURE: 74 MMHG

## 2024-10-14 DIAGNOSIS — Z34.90 FUNDAL HEIGHT HIGH FOR DATES: ICD-10-CM

## 2024-10-14 DIAGNOSIS — E66.09 OTHER OBESITY DUE TO EXCESS CALORIES AFFECTING PREGNANCY IN THIRD TRIMESTER: Primary | ICD-10-CM

## 2024-10-14 DIAGNOSIS — O99.213 OTHER OBESITY DUE TO EXCESS CALORIES AFFECTING PREGNANCY IN THIRD TRIMESTER: Primary | ICD-10-CM

## 2024-10-14 DIAGNOSIS — Z3A.37 37 WEEKS GESTATION OF PREGNANCY: ICD-10-CM

## 2024-10-15 ENCOUNTER — HOSPITAL ENCOUNTER (OUTPATIENT)
Dept: PREADMISSION TESTING | Facility: HOSPITAL | Age: 24
Discharge: HOME | End: 2024-10-15
Payer: MEDICAID

## 2024-10-15 DIAGNOSIS — O99.213 OTHER OBESITY DUE TO EXCESS CALORIES AFFECTING PREGNANCY IN THIRD TRIMESTER: ICD-10-CM

## 2024-10-15 DIAGNOSIS — Z34.90 FUNDAL HEIGHT HIGH FOR DATES: ICD-10-CM

## 2024-10-15 DIAGNOSIS — E66.09 OTHER OBESITY DUE TO EXCESS CALORIES AFFECTING PREGNANCY IN THIRD TRIMESTER: ICD-10-CM

## 2024-10-15 DIAGNOSIS — Z3A.36 36 WEEKS GESTATION OF PREGNANCY: ICD-10-CM

## 2024-10-15 LAB
BASOPHILS # BLD AUTO: 0.06 X10(3)/MCL (ref 0.01–0.08)
BASOPHILS NFR BLD AUTO: 0.6 % (ref 0.1–1.2)
EOSINOPHIL # BLD AUTO: 0.1 X10(3)/MCL (ref 0.04–0.36)
EOSINOPHIL NFR BLD AUTO: 0.9 % (ref 0.7–7)
ERYTHROCYTE [DISTWIDTH] IN BLOOD BY AUTOMATED COUNT: 11.7 % (ref 11–14.5)
HBV SURFACE AG SERPL QL IA: NEGATIVE
HBV SURFACE AG SERPL QL IA: NORMAL
HCT VFR BLD AUTO: 35.1 % (ref 36–48)
HGB BLD-MCNC: 11.6 G/DL (ref 11.8–16)
HIV 1+2 AB+HIV1 P24 AG SERPL QL IA: NONREACTIVE
IMM GRANULOCYTES # BLD AUTO: 0.16 X10(3)/MCL (ref 0–0.03)
IMM GRANULOCYTES NFR BLD AUTO: 1.5 % (ref 0–0.5)
LYMPHOCYTES # BLD AUTO: 1.89 X10(3)/MCL (ref 1.16–3.74)
LYMPHOCYTES NFR BLD AUTO: 17.4 % (ref 20–55)
MCH RBC QN AUTO: 29.5 PG (ref 27–34)
MCHC RBC AUTO-ENTMCNC: 33 G/DL (ref 31–37)
MCV RBC AUTO: 89.3 FL (ref 79–99)
MONOCYTES # BLD AUTO: 1.08 X10(3)/MCL (ref 0.24–0.36)
MONOCYTES NFR BLD AUTO: 9.9 % (ref 4.7–12.5)
NEUTROPHILS # BLD AUTO: 7.57 X10(3)/MCL (ref 1.56–6.13)
NEUTROPHILS NFR BLD AUTO: 69.7 % (ref 37–73)
PLATELET # BLD AUTO: 130 X10(3)/MCL (ref 140–371)
PMV BLD AUTO: 12.5 FL (ref 9.4–12.4)
RBC # BLD AUTO: 3.93 X10(6)/MCL (ref 4–5.1)
T PALLIDUM AB SER QL: NONREACTIVE
WBC # BLD AUTO: 10.86 X10(3)/MCL (ref 4–11.5)

## 2024-10-15 PROCEDURE — 87340 HEPATITIS B SURFACE AG IA: CPT | Performed by: OBSTETRICS & GYNECOLOGY

## 2024-10-15 PROCEDURE — 86780 TREPONEMA PALLIDUM: CPT | Performed by: OBSTETRICS & GYNECOLOGY

## 2024-10-15 PROCEDURE — 87389 HIV-1 AG W/HIV-1&-2 AB AG IA: CPT | Performed by: OBSTETRICS & GYNECOLOGY

## 2024-10-15 PROCEDURE — 85025 COMPLETE CBC W/AUTO DIFF WBC: CPT | Performed by: OBSTETRICS & GYNECOLOGY

## 2024-10-15 NOTE — DISCHARGE INSTRUCTIONS
Things to Bring to the Hospital    To help make your stay as comfortable as possible you should bring the following items when you come to the hospital:    TOOTHBRUSH  TOOTHPASTE  SHAMPOO/CONDITIONER  DEODORANT  HAIRBRUSH OR COMB  UNDERWEAR  SOAP  SANITARY NAPKINS-OVERNIGHTS (This item is optional unless you have a preference for the type you use.)  NURSING PADS  BREAST PUMP AND NURSING PILLOW    Other items needed for both mom and baby include:    Bras (2-3).  These should be ones that were worn during pregnancy or nursing bras for breastfeeding.   Gowns, slippers, and a robe.   One outfit of baby clothes to take the baby home in as well as a blanket. The hospital will provide baby clothes, blankets, diapers, and wipes during the hospital stay.   Federally approved car seat is required for the infant to go home.     OB VISITATION POLICY    The OB Department is open to family for patient visitation.  Smoking is not allowed on the premises.  Visitors must check with staff before entering a patient's room if there is a sign posted on the door or in the munoz.   Visitors may be in the room with baby if they are free from infection or any signs and symptoms of illness.  All visitors must use hand  or soap and water before touching the baby.   No children under age 12 are allowed to sleep overnight or to be left unattended in room.   Our rooms only accommodate one overnight guest.  While in labor and delivery room, you will be allowed 2 support persons if you choose and one additional guest will be allowed at the discretion of the physician or nurse.   The 2-3 labor and delivery support people may be interchangeable at the discretion of the OB nurse in charge of your care.   Any other visitors will be directed to the waiting room or post-partum room until after delivery.   No one will be allowed to stay in the labor and delivery hallway.  After delivery, visitors will not be limited unless a procedure is in  progress or your conditions warrants it.    Videos and photographs are not allowed to be taken during the delivery process. They may be taken after the baby is born and declared in good condition by nursing staff or physician.   If you will be having a delivery by , you will be allowed (1) support person in operating room unless the procedure is an emergency or under general anesthesia. No video or photography is allowed until after the procedure is complete.  The support person present will be asked to leave the operating room with the baby after delivery or if any problems arise.   Family and friends should be made familiar with the visitor policy for our facility, so that we may insure that the safety and well-being of you and your baby during this very important time.

## 2024-10-15 NOTE — PRE-PROCEDURE INSTRUCTIONS
OB pre-admit instructions given for upcoming delivery. Patient states understanding of information. Encouraged any questions and concerns. Lab work completed.

## 2024-10-17 NOTE — PROGRESS NOTES
HPI  24 y.o.  at 38w0d here for OB check.  Reports positive fetal movement. denies LOF, CTX.       ROS  Review of Systems   Constitutional:  Negative for chills and fever.   Gastrointestinal:  Negative for abdominal pain, constipation and diarrhea.   Genitourinary:  Negative for flank pain, genital sores, pelvic pain, urgency, vaginal bleeding, vaginal discharge, vaginal pain, postcoital bleeding and vaginal odor.         OBJECTIVE  /84   Wt 112.3 kg (247 lb 9.6 oz)   BMI 38.78 kg/m²     Gen: No distress  Abdomen: Gravid, non-tender  Extremities: No edema  Cervix: 0/0/-3  NST:   - Baseline: 120  - Variability: moderate  - Accelerations:PRESENT  - Decelerations: ABSENT  - Time on monitor: 20 mins  - Category: .reactive, category 1    [place]:   - CTX: q 3-4 mins       ASSESSMENT  1. Other obesity due to excess calories affecting pregnancy in third trimester  - Fetal non-stress test- Today    2. 38 weeks gestation of pregnancy  - POCT Urinalysis, Dipstick, Automated, W/O Scope  - Fetal non-stress test- Today    3. At high risk for complications of intrauterine pregnancy (IUP)  - Fetal non-stress test- Today        PLAN  Reviewed standard labor unit and kick count precautions.  Discussed pre-eclampsia precautions.  Discussed COVID-19 risks, social distancing, and isolation if respiratory symptoms develop.     Cont weekly fetal monitoring   PO hydration   CTX precautions      RTC 1 wk NST    This note was transcribed by Snow Amaya. There may be transcription errors as a result, however minimal. Effort has been made to ensure accuracy of transcription, but any obvious errors or omissions should be clarified with the author of the document.       I agree with the above documentation.

## 2024-10-21 ENCOUNTER — CLINICAL SUPPORT (OUTPATIENT)
Dept: OBSTETRICS AND GYNECOLOGY | Facility: CLINIC | Age: 24
End: 2024-10-21
Payer: MEDICAID

## 2024-10-21 VITALS
DIASTOLIC BLOOD PRESSURE: 84 MMHG | WEIGHT: 247.63 LBS | SYSTOLIC BLOOD PRESSURE: 124 MMHG | BODY MASS INDEX: 38.78 KG/M2

## 2024-10-21 DIAGNOSIS — O09.90 AT HIGH RISK FOR COMPLICATIONS OF INTRAUTERINE PREGNANCY (IUP): ICD-10-CM

## 2024-10-21 DIAGNOSIS — O99.213 OTHER OBESITY DUE TO EXCESS CALORIES AFFECTING PREGNANCY IN THIRD TRIMESTER: Primary | ICD-10-CM

## 2024-10-21 DIAGNOSIS — Z3A.38 38 WEEKS GESTATION OF PREGNANCY: ICD-10-CM

## 2024-10-21 DIAGNOSIS — E66.09 OTHER OBESITY DUE TO EXCESS CALORIES AFFECTING PREGNANCY IN THIRD TRIMESTER: Primary | ICD-10-CM

## 2024-10-28 ENCOUNTER — CLINICAL SUPPORT (OUTPATIENT)
Dept: OBSTETRICS AND GYNECOLOGY | Facility: CLINIC | Age: 24
End: 2024-10-28
Payer: MEDICAID

## 2024-10-28 ENCOUNTER — HOSPITAL ENCOUNTER (INPATIENT)
Facility: HOSPITAL | Age: 24
LOS: 4 days | Discharge: HOME | End: 2024-11-01
Attending: OBSTETRICS & GYNECOLOGY | Admitting: OBSTETRICS & GYNECOLOGY
Payer: MEDICAID

## 2024-10-28 VITALS
DIASTOLIC BLOOD PRESSURE: 78 MMHG | WEIGHT: 248 LBS | HEIGHT: 67 IN | BODY MASS INDEX: 38.92 KG/M2 | SYSTOLIC BLOOD PRESSURE: 118 MMHG

## 2024-10-28 DIAGNOSIS — O99.213 OTHER OBESITY DUE TO EXCESS CALORIES AFFECTING PREGNANCY IN THIRD TRIMESTER: Primary | ICD-10-CM

## 2024-10-28 DIAGNOSIS — O09.90 AT HIGH RISK FOR COMPLICATIONS OF INTRAUTERINE PREGNANCY (IUP): ICD-10-CM

## 2024-10-28 DIAGNOSIS — E66.09 OTHER OBESITY DUE TO EXCESS CALORIES AFFECTING PREGNANCY IN THIRD TRIMESTER: ICD-10-CM

## 2024-10-28 DIAGNOSIS — E66.09 OTHER OBESITY DUE TO EXCESS CALORIES AFFECTING PREGNANCY IN THIRD TRIMESTER: Primary | ICD-10-CM

## 2024-10-28 DIAGNOSIS — O99.213 OBESITY AFFECTING PREGNANCY IN THIRD TRIMESTER: ICD-10-CM

## 2024-10-28 DIAGNOSIS — Z98.891 STATUS POST CESAREAN SECTION: Primary | ICD-10-CM

## 2024-10-28 DIAGNOSIS — A59.01 TRICHOMONAL VAGINITIS DURING PREGNANCY IN THIRD TRIMESTER: ICD-10-CM

## 2024-10-28 DIAGNOSIS — Z3A.39 39 WEEKS GESTATION OF PREGNANCY: ICD-10-CM

## 2024-10-28 DIAGNOSIS — Z3A.36 36 WEEKS GESTATION OF PREGNANCY: ICD-10-CM

## 2024-10-28 DIAGNOSIS — O99.213 OTHER OBESITY DUE TO EXCESS CALORIES AFFECTING PREGNANCY IN THIRD TRIMESTER: ICD-10-CM

## 2024-10-28 DIAGNOSIS — Z13.31 POSITIVE DEPRESSION SCREENING: ICD-10-CM

## 2024-10-28 DIAGNOSIS — O36.8390 FETAL HEART RATE DECELERATIONS AFFECTING MANAGEMENT OF MOTHER: ICD-10-CM

## 2024-10-28 DIAGNOSIS — O23.593 TRICHOMONAL VAGINITIS DURING PREGNANCY IN THIRD TRIMESTER: ICD-10-CM

## 2024-10-28 DIAGNOSIS — Z34.90 FUNDAL HEIGHT HIGH FOR DATES: ICD-10-CM

## 2024-10-28 DIAGNOSIS — O99.820 GBS (GROUP B STREPTOCOCCUS CARRIER), +RV CULTURE, CURRENTLY PREGNANT: ICD-10-CM

## 2024-10-28 LAB
ALBUMIN SERPL-MCNC: 3.4 G/DL (ref 3.4–5)
ALBUMIN/GLOB SERPL: 1.1 RATIO
ALP SERPL-CCNC: 147 UNIT/L (ref 50–144)
ALT SERPL-CCNC: 15 UNIT/L (ref 1–45)
AMPHET UR QL SCN: NEGATIVE
ANION GAP SERPL CALC-SCNC: 8 MEQ/L (ref 2–13)
AST SERPL-CCNC: 21 UNIT/L (ref 14–36)
BARBITURATE SCN PRESENT UR: NEGATIVE
BENZODIAZ UR QL SCN: NEGATIVE
BILIRUB SERPL-MCNC: 1 MG/DL (ref 0–1)
BILIRUB UR QL STRIP.AUTO: NEGATIVE
BILIRUB UR QL STRIP: NORMAL
BUN SERPL-MCNC: 5 MG/DL (ref 7–20)
CALCIUM SERPL-MCNC: 9.5 MG/DL (ref 8.4–10.2)
CANNABINOIDS UR QL SCN: NEGATIVE
CHLORIDE SERPL-SCNC: 109 MMOL/L (ref 98–110)
CLARITY UR: CLEAR
CO2 SERPL-SCNC: 18 MMOL/L (ref 21–32)
COCAINE UR QL SCN: NEGATIVE
COLOR UR AUTO: YELLOW
CREAT SERPL-MCNC: 0.55 MG/DL (ref 0.66–1.25)
CREAT/UREA NIT SERPL: 9 (ref 12–20)
ERYTHROCYTE [DISTWIDTH] IN BLOOD BY AUTOMATED COUNT: 12.1 % (ref 11–14.5)
GFR SERPLBLD CREATININE-BSD FMLA CKD-EPI: >90 ML/MIN/1.73/M2
GLOBULIN SER-MCNC: 3.2 GM/DL (ref 2–3.9)
GLUCOSE SERPL-MCNC: 86 MG/DL (ref 70–115)
GLUCOSE UR QL STRIP: NEGATIVE
GLUCOSE UR QL STRIP: NEGATIVE
GROUP & RH: NORMAL
HCT VFR BLD AUTO: 37.5 % (ref 36–48)
HGB BLD-MCNC: 12.2 G/DL (ref 11.8–16)
HGB UR QL STRIP: NEGATIVE
INDIRECT COOMBS: NORMAL
KETONES UR QL STRIP: NEGATIVE
KETONES UR QL STRIP: NORMAL
LEUKOCYTE ESTERASE UR QL STRIP: NEGATIVE
LEUKOCYTE ESTERASE UR QL STRIP: NEGATIVE
MCH RBC QN AUTO: 29.4 PG (ref 27–34)
MCHC RBC AUTO-ENTMCNC: 32.5 G/DL (ref 31–37)
MCV RBC AUTO: 90.4 FL (ref 79–99)
METHADONE UR QL SCN: NEGATIVE
NITRITE UR QL STRIP: NEGATIVE
NRBC BLD AUTO-RTO: 0 %
OPIATES UR QL SCN: NEGATIVE
PCP UR QL: NEGATIVE
PH UR STRIP: 6 [PH]
PH UR: 6 [PH] (ref 5–8)
PH, POC UA: NORMAL
PLATELET # BLD AUTO: 157 X10(3)/MCL (ref 140–371)
PMV BLD AUTO: 12.5 FL (ref 9.4–12.4)
POC BLOOD, URINE: NORMAL
POC NITRATES, URINE: NEGATIVE
POTASSIUM SERPL-SCNC: 4.1 MMOL/L (ref 3.5–5.1)
PROT SERPL-MCNC: 6.6 GM/DL (ref 6.3–8.2)
PROT UR QL STRIP: ABNORMAL
PROT UR QL STRIP: NEGATIVE
RBC # BLD AUTO: 4.15 X10(6)/MCL (ref 4–5.1)
SODIUM SERPL-SCNC: 135 MMOL/L (ref 136–145)
SP GR UR STRIP.AUTO: >=1.03 (ref 1–1.03)
SP GR UR STRIP: NORMAL (ref 1–1.03)
SPECIMEN OUTDATE: NORMAL
UROBILINOGEN UR STRIP-ACNC: 0.2
UROBILINOGEN UR STRIP-ACNC: NORMAL (ref 0.3–2.2)
WBC # BLD AUTO: 10.66 X10(3)/MCL (ref 4–11.5)

## 2024-10-28 PROCEDURE — 80307 DRUG TEST PRSMV CHEM ANLYZR: CPT | Performed by: OBSTETRICS & GYNECOLOGY

## 2024-10-28 PROCEDURE — 36415 COLL VENOUS BLD VENIPUNCTURE: CPT | Performed by: OBSTETRICS & GYNECOLOGY

## 2024-10-28 PROCEDURE — 86900 BLOOD TYPING SEROLOGIC ABO: CPT | Performed by: OBSTETRICS & GYNECOLOGY

## 2024-10-28 PROCEDURE — 80053 COMPREHEN METABOLIC PANEL: CPT | Performed by: OBSTETRICS & GYNECOLOGY

## 2024-10-28 PROCEDURE — 63600175 PHARM REV CODE 636 W HCPCS: Mod: JZ,JG | Performed by: OBSTETRICS & GYNECOLOGY

## 2024-10-28 PROCEDURE — 85027 COMPLETE CBC AUTOMATED: CPT | Performed by: OBSTETRICS & GYNECOLOGY

## 2024-10-28 PROCEDURE — 81003 URINALYSIS AUTO W/O SCOPE: CPT | Performed by: OBSTETRICS & GYNECOLOGY

## 2024-10-28 PROCEDURE — 11000001 HC ACUTE MED/SURG PRIVATE ROOM

## 2024-10-28 PROCEDURE — 25000003 PHARM REV CODE 250: Performed by: OBSTETRICS & GYNECOLOGY

## 2024-10-28 RX ORDER — OXYTOCIN-SODIUM CHLORIDE 0.9% IV SOLN 30 UNIT/500ML 30-0.9/5 UT/ML-%
0-32 SOLUTION INTRAVENOUS CONTINUOUS
Status: DISCONTINUED | OUTPATIENT
Start: 2024-10-28 | End: 2024-10-28

## 2024-10-28 RX ORDER — CLINDAMYCIN PHOSPHATE 900 MG/50ML
900 INJECTION, SOLUTION INTRAVENOUS ONCE AS NEEDED
Status: DISCONTINUED | OUTPATIENT
Start: 2024-10-28 | End: 2024-10-28

## 2024-10-28 RX ORDER — ACETAMINOPHEN 325 MG/1
650 TABLET ORAL EVERY 6 HOURS PRN
Status: DISCONTINUED | OUTPATIENT
Start: 2024-10-28 | End: 2024-11-01 | Stop reason: HOSPADM

## 2024-10-28 RX ORDER — BUTORPHANOL TARTRATE 2 MG/ML
2 INJECTION INTRAMUSCULAR; INTRAVENOUS
Status: DISCONTINUED | OUTPATIENT
Start: 2024-10-28 | End: 2024-11-01 | Stop reason: HOSPADM

## 2024-10-28 RX ORDER — ONDANSETRON 4 MG/1
8 TABLET, ORALLY DISINTEGRATING ORAL EVERY 8 HOURS PRN
Status: DISCONTINUED | OUTPATIENT
Start: 2024-10-28 | End: 2024-11-01 | Stop reason: HOSPADM

## 2024-10-28 RX ORDER — CARBOPROST TROMETHAMINE 250 UG/ML
250 INJECTION, SOLUTION INTRAMUSCULAR
Status: DISCONTINUED | OUTPATIENT
Start: 2024-10-28 | End: 2024-11-01 | Stop reason: HOSPADM

## 2024-10-28 RX ORDER — DIPHENOXYLATE HYDROCHLORIDE AND ATROPINE SULFATE 2.5; .025 MG/1; MG/1
2 TABLET ORAL EVERY 6 HOURS PRN
Status: DISCONTINUED | OUTPATIENT
Start: 2024-10-28 | End: 2024-11-01 | Stop reason: HOSPADM

## 2024-10-28 RX ORDER — CLINDAMYCIN PHOSPHATE 900 MG/50ML
900 INJECTION, SOLUTION INTRAVENOUS
Status: DISCONTINUED | OUTPATIENT
Start: 2024-10-28 | End: 2024-10-31

## 2024-10-28 RX ORDER — OXYTOCIN 10 [USP'U]/ML
10 INJECTION, SOLUTION INTRAMUSCULAR; INTRAVENOUS ONCE AS NEEDED
Status: COMPLETED | OUTPATIENT
Start: 2024-10-28 | End: 2024-10-29

## 2024-10-28 RX ORDER — OXYTOCIN-SODIUM CHLORIDE 0.9% IV SOLN 30 UNIT/500ML 30-0.9/5 UT/ML-%
95 SOLUTION INTRAVENOUS ONCE AS NEEDED
Status: DISCONTINUED | OUTPATIENT
Start: 2024-10-28 | End: 2024-11-01 | Stop reason: HOSPADM

## 2024-10-28 RX ORDER — CLINDAMYCIN PHOSPHATE 900 MG/50ML
900 INJECTION, SOLUTION INTRAVENOUS
Status: DISCONTINUED | OUTPATIENT
Start: 2024-10-28 | End: 2024-10-28

## 2024-10-28 RX ORDER — OXYTOCIN-SODIUM CHLORIDE 0.9% IV SOLN 30 UNIT/500ML 30-0.9/5 UT/ML-%
10 SOLUTION INTRAVENOUS ONCE AS NEEDED
Status: DISCONTINUED | OUTPATIENT
Start: 2024-10-28 | End: 2024-11-01 | Stop reason: HOSPADM

## 2024-10-28 RX ORDER — CALCIUM CARBONATE 200(500)MG
500 TABLET,CHEWABLE ORAL 3 TIMES DAILY PRN
Status: DISCONTINUED | OUTPATIENT
Start: 2024-10-28 | End: 2024-11-01 | Stop reason: HOSPADM

## 2024-10-28 RX ORDER — CEFAZOLIN 2 G/1
2 INJECTION, POWDER, FOR SOLUTION INTRAMUSCULAR; INTRAVENOUS ONCE AS NEEDED
Status: DISCONTINUED | OUTPATIENT
Start: 2024-10-28 | End: 2024-10-28

## 2024-10-28 RX ORDER — MISOPROSTOL 100 UG/1
800 TABLET ORAL ONCE AS NEEDED
Status: DISCONTINUED | OUTPATIENT
Start: 2024-10-28 | End: 2024-11-01 | Stop reason: HOSPADM

## 2024-10-28 RX ORDER — METHYLERGONOVINE MALEATE 0.2 MG/ML
200 INJECTION INTRAVENOUS ONCE AS NEEDED
Status: DISCONTINUED | OUTPATIENT
Start: 2024-10-28 | End: 2024-11-01 | Stop reason: HOSPADM

## 2024-10-28 RX ORDER — OXYTOCIN-SODIUM CHLORIDE 0.9% IV SOLN 30 UNIT/500ML 30-0.9/5 UT/ML-%
30 SOLUTION INTRAVENOUS ONCE AS NEEDED
Status: COMPLETED | OUTPATIENT
Start: 2024-10-28 | End: 2024-10-29

## 2024-10-28 RX ORDER — SODIUM CHLORIDE 9 MG/ML
INJECTION, SOLUTION INTRAVENOUS
Status: DISCONTINUED | OUTPATIENT
Start: 2024-10-28 | End: 2024-10-31

## 2024-10-28 RX ORDER — MISOPROSTOL 100 MCG
25 TABLET ORAL EVERY 4 HOURS PRN
Status: DISCONTINUED | OUTPATIENT
Start: 2024-10-28 | End: 2024-10-31

## 2024-10-28 RX ORDER — BUTORPHANOL TARTRATE 2 MG/ML
1 INJECTION INTRAMUSCULAR; INTRAVENOUS
Status: DISCONTINUED | OUTPATIENT
Start: 2024-10-28 | End: 2024-11-01 | Stop reason: HOSPADM

## 2024-10-28 RX ORDER — TERBUTALINE SULFATE 1 MG/ML
0.25 INJECTION SUBCUTANEOUS
Status: DISCONTINUED | OUTPATIENT
Start: 2024-10-28 | End: 2024-11-01 | Stop reason: HOSPADM

## 2024-10-28 RX ORDER — LIDOCAINE HYDROCHLORIDE 10 MG/ML
10 INJECTION, SOLUTION INFILTRATION; PERINEURAL ONCE AS NEEDED
Status: COMPLETED | OUTPATIENT
Start: 2024-10-28 | End: 2024-10-29

## 2024-10-28 RX ORDER — TRANEXAMIC ACID 10 MG/ML
1000 INJECTION, SOLUTION INTRAVENOUS EVERY 30 MIN PRN
Status: DISCONTINUED | OUTPATIENT
Start: 2024-10-28 | End: 2024-11-01 | Stop reason: HOSPADM

## 2024-10-28 RX ORDER — OXYTOCIN-SODIUM CHLORIDE 0.9% IV SOLN 30 UNIT/500ML 30-0.9/5 UT/ML-%
95 SOLUTION INTRAVENOUS CONTINUOUS PRN
Status: DISCONTINUED | OUTPATIENT
Start: 2024-10-28 | End: 2024-11-01 | Stop reason: HOSPADM

## 2024-10-28 RX ORDER — SODIUM CHLORIDE, SODIUM LACTATE, POTASSIUM CHLORIDE, CALCIUM CHLORIDE 600; 310; 30; 20 MG/100ML; MG/100ML; MG/100ML; MG/100ML
INJECTION, SOLUTION INTRAVENOUS CONTINUOUS
Status: DISCONTINUED | OUTPATIENT
Start: 2024-10-28 | End: 2024-10-31

## 2024-10-28 RX ORDER — SIMETHICONE 80 MG
1 TABLET,CHEWABLE ORAL 4 TIMES DAILY PRN
Status: DISCONTINUED | OUTPATIENT
Start: 2024-10-28 | End: 2024-10-31

## 2024-10-28 RX ADMIN — MISOPROSTOL 25 MCG: 100 TABLET ORAL at 01:10

## 2024-10-28 RX ADMIN — MISOPROSTOL 25 MCG: 100 TABLET ORAL at 05:10

## 2024-10-28 RX ADMIN — CLINDAMYCIN PHOSPHATE 900 MG: 900 INJECTION, SOLUTION INTRAVENOUS at 11:10

## 2024-10-28 RX ADMIN — SODIUM CHLORIDE, POTASSIUM CHLORIDE, SODIUM LACTATE AND CALCIUM CHLORIDE 1500 ML: 600; 310; 30; 20 INJECTION, SOLUTION INTRAVENOUS at 11:10

## 2024-10-28 RX ADMIN — Medication 2 MILLI-UNITS/MIN: at 11:10

## 2024-10-28 RX ADMIN — MISOPROSTOL 25 MCG: 100 TABLET ORAL at 09:10

## 2024-10-28 RX ADMIN — CLINDAMYCIN PHOSPHATE 900 MG: 900 INJECTION, SOLUTION INTRAVENOUS at 06:10

## 2024-10-29 ENCOUNTER — ANESTHESIA (OUTPATIENT)
Dept: SURGERY | Facility: HOSPITAL | Age: 24
End: 2024-10-29
Payer: MEDICAID

## 2024-10-29 ENCOUNTER — ANESTHESIA EVENT (OUTPATIENT)
Dept: SURGERY | Facility: HOSPITAL | Age: 24
End: 2024-10-29
Payer: MEDICAID

## 2024-10-29 PROBLEM — O99.820 GBS (GROUP B STREPTOCOCCUS CARRIER), +RV CULTURE, CURRENTLY PREGNANT: Status: ACTIVE | Noted: 2024-10-29

## 2024-10-29 PROBLEM — Z98.891 STATUS POST CESAREAN SECTION: Status: ACTIVE | Noted: 2024-10-29

## 2024-10-29 PROBLEM — Z3A.36 36 WEEKS GESTATION OF PREGNANCY: Status: ACTIVE | Noted: 2024-10-29

## 2024-10-29 LAB
HCT VFR BLD AUTO: 34.9 % (ref 36–48)
HGB BLD-MCNC: 11.8 G/DL (ref 11.8–16)

## 2024-10-29 PROCEDURE — 63600175 PHARM REV CODE 636 W HCPCS: Performed by: OBSTETRICS & GYNECOLOGY

## 2024-10-29 PROCEDURE — 63600175 PHARM REV CODE 636 W HCPCS: Mod: JZ,JG | Performed by: OBSTETRICS & GYNECOLOGY

## 2024-10-29 PROCEDURE — 25000003 PHARM REV CODE 250: Performed by: OBSTETRICS & GYNECOLOGY

## 2024-10-29 PROCEDURE — 63600175 PHARM REV CODE 636 W HCPCS

## 2024-10-29 PROCEDURE — 37000008 HC ANESTHESIA 1ST 15 MINUTES: Performed by: OBSTETRICS & GYNECOLOGY

## 2024-10-29 PROCEDURE — 25000003 PHARM REV CODE 250: Performed by: NURSE ANESTHETIST, CERTIFIED REGISTERED

## 2024-10-29 PROCEDURE — 59514 CESAREAN DELIVERY ONLY: CPT | Mod: GB,,, | Performed by: OBSTETRICS & GYNECOLOGY

## 2024-10-29 PROCEDURE — 11000001 HC ACUTE MED/SURG PRIVATE ROOM

## 2024-10-29 PROCEDURE — 71000033 HC RECOVERY, INTIAL HOUR: Performed by: OBSTETRICS & GYNECOLOGY

## 2024-10-29 PROCEDURE — 99900035 HC TECH TIME PER 15 MIN (STAT)

## 2024-10-29 PROCEDURE — 85014 HEMATOCRIT: CPT | Performed by: OBSTETRICS & GYNECOLOGY

## 2024-10-29 PROCEDURE — 36000709 HC OR TIME LEV III EA ADD 15 MIN: Performed by: OBSTETRICS & GYNECOLOGY

## 2024-10-29 PROCEDURE — 36415 COLL VENOUS BLD VENIPUNCTURE: CPT | Performed by: OBSTETRICS & GYNECOLOGY

## 2024-10-29 PROCEDURE — 37000009 HC ANESTHESIA EA ADD 15 MINS: Performed by: OBSTETRICS & GYNECOLOGY

## 2024-10-29 PROCEDURE — 10907ZC DRAINAGE OF AMNIOTIC FLUID, THERAPEUTIC FROM PRODUCTS OF CONCEPTION, VIA NATURAL OR ARTIFICIAL OPENING: ICD-10-PCS | Performed by: OBSTETRICS & GYNECOLOGY

## 2024-10-29 PROCEDURE — 63600175 PHARM REV CODE 636 W HCPCS: Performed by: NURSE ANESTHETIST, CERTIFIED REGISTERED

## 2024-10-29 PROCEDURE — 62326 NJX INTERLAMINAR LMBR/SAC: CPT | Performed by: NURSE ANESTHETIST, CERTIFIED REGISTERED

## 2024-10-29 PROCEDURE — 36000708 HC OR TIME LEV III 1ST 15 MIN: Performed by: OBSTETRICS & GYNECOLOGY

## 2024-10-29 RX ORDER — CLINDAMYCIN PHOSPHATE 900 MG/50ML
900 INJECTION, SOLUTION INTRAVENOUS ONCE AS NEEDED
Status: DISCONTINUED | OUTPATIENT
Start: 2024-10-29 | End: 2024-10-31

## 2024-10-29 RX ORDER — METHYLERGONOVINE MALEATE 0.2 MG/ML
INJECTION INTRAVENOUS
Status: DISCONTINUED | OUTPATIENT
Start: 2024-10-29 | End: 2024-10-29 | Stop reason: HOSPADM

## 2024-10-29 RX ORDER — FAMOTIDINE 10 MG/ML
20 INJECTION INTRAVENOUS
Status: DISCONTINUED | OUTPATIENT
Start: 2024-10-29 | End: 2024-11-01 | Stop reason: HOSPADM

## 2024-10-29 RX ORDER — MUPIROCIN 20 MG/G
OINTMENT TOPICAL 2 TIMES DAILY
Status: DISCONTINUED | OUTPATIENT
Start: 2024-10-29 | End: 2024-11-01 | Stop reason: HOSPADM

## 2024-10-29 RX ORDER — HYDROCODONE BITARTRATE AND ACETAMINOPHEN 7.5; 325 MG/1; MG/1
1 TABLET ORAL EVERY 4 HOURS PRN
Status: DISCONTINUED | OUTPATIENT
Start: 2024-10-29 | End: 2024-11-01 | Stop reason: HOSPADM

## 2024-10-29 RX ORDER — OXYTOCIN-SODIUM CHLORIDE 0.9% IV SOLN 30 UNIT/500ML 30-0.9/5 UT/ML-%
0-32 SOLUTION INTRAVENOUS CONTINUOUS
Status: DISCONTINUED | OUTPATIENT
Start: 2024-10-29 | End: 2024-10-31

## 2024-10-29 RX ORDER — OXYCODONE AND ACETAMINOPHEN 10; 325 MG/1; MG/1
1 TABLET ORAL EVERY 4 HOURS PRN
Status: DISCONTINUED | OUTPATIENT
Start: 2024-10-29 | End: 2024-11-01 | Stop reason: HOSPADM

## 2024-10-29 RX ORDER — PHENYLEPHRINE HYDROCHLORIDE 10 MG/ML
INJECTION INTRAVENOUS
Status: DISCONTINUED | OUTPATIENT
Start: 2024-10-29 | End: 2024-10-29

## 2024-10-29 RX ORDER — FENTANYL CITRATE 50 UG/ML
INJECTION, SOLUTION INTRAMUSCULAR; INTRAVENOUS
Status: DISCONTINUED | OUTPATIENT
Start: 2024-10-29 | End: 2024-10-29

## 2024-10-29 RX ORDER — ADHESIVE BANDAGE
30 BANDAGE TOPICAL 2 TIMES DAILY PRN
Status: DISCONTINUED | OUTPATIENT
Start: 2024-10-30 | End: 2024-11-01 | Stop reason: HOSPADM

## 2024-10-29 RX ORDER — CARBOPROST TROMETHAMINE 250 UG/ML
250 INJECTION, SOLUTION INTRAMUSCULAR
Status: DISCONTINUED | OUTPATIENT
Start: 2024-10-29 | End: 2024-11-01 | Stop reason: HOSPADM

## 2024-10-29 RX ORDER — AZITHROMYCIN 100 MG/ML
INJECTION, POWDER, LYOPHILIZED, FOR SOLUTION INTRAVENOUS
Status: COMPLETED
Start: 2024-10-29 | End: 2024-10-29

## 2024-10-29 RX ORDER — SIMETHICONE 80 MG
1 TABLET,CHEWABLE ORAL EVERY 6 HOURS PRN
Status: DISCONTINUED | OUTPATIENT
Start: 2024-10-29 | End: 2024-11-01 | Stop reason: HOSPADM

## 2024-10-29 RX ORDER — SODIUM CHLORIDE 0.9 % (FLUSH) 0.9 %
10 SYRINGE (ML) INJECTION
Status: DISCONTINUED | OUTPATIENT
Start: 2024-10-29 | End: 2024-11-01 | Stop reason: HOSPADM

## 2024-10-29 RX ORDER — CARBOPROST TROMETHAMINE 250 UG/ML
250 INJECTION, SOLUTION INTRAMUSCULAR
Status: CANCELLED | OUTPATIENT
Start: 2024-10-29

## 2024-10-29 RX ORDER — AMOXICILLIN 250 MG
1 CAPSULE ORAL NIGHTLY PRN
Status: DISCONTINUED | OUTPATIENT
Start: 2024-10-29 | End: 2024-11-01 | Stop reason: HOSPADM

## 2024-10-29 RX ORDER — ROPIVACAINE HYDROCHLORIDE 2 MG/ML
12 INJECTION, SOLUTION EPIDURAL; INFILTRATION CONTINUOUS
Status: DISCONTINUED | OUTPATIENT
Start: 2024-10-29 | End: 2024-10-31

## 2024-10-29 RX ORDER — DIPHENOXYLATE HYDROCHLORIDE AND ATROPINE SULFATE 2.5; .025 MG/1; MG/1
2 TABLET ORAL EVERY 6 HOURS PRN
Status: CANCELLED | OUTPATIENT
Start: 2024-10-29

## 2024-10-29 RX ORDER — OXYTOCIN 10 [USP'U]/ML
10 INJECTION, SOLUTION INTRAMUSCULAR; INTRAVENOUS ONCE AS NEEDED
Status: CANCELLED | OUTPATIENT
Start: 2024-10-29 | End: 2036-03-27

## 2024-10-29 RX ORDER — CEFAZOLIN SODIUM 1 G/3ML
INJECTION, POWDER, FOR SOLUTION INTRAMUSCULAR; INTRAVENOUS
Status: DISCONTINUED | OUTPATIENT
Start: 2024-10-29 | End: 2024-10-29 | Stop reason: HOSPADM

## 2024-10-29 RX ORDER — BISACODYL 10 MG/1
10 SUPPOSITORY RECTAL ONCE AS NEEDED
Status: DISCONTINUED | OUTPATIENT
Start: 2024-10-29 | End: 2024-11-01 | Stop reason: HOSPADM

## 2024-10-29 RX ORDER — OXYTOCIN-SODIUM CHLORIDE 0.9% IV SOLN 30 UNIT/500ML 30-0.9/5 UT/ML-%
95 SOLUTION INTRAVENOUS CONTINUOUS PRN
Status: DISCONTINUED | OUTPATIENT
Start: 2024-10-29 | End: 2024-10-29

## 2024-10-29 RX ORDER — METHYLERGONOVINE MALEATE 0.2 MG/ML
200 INJECTION INTRAVENOUS ONCE AS NEEDED
Status: CANCELLED | OUTPATIENT
Start: 2024-10-29 | End: 2036-03-27

## 2024-10-29 RX ORDER — ROPIVACAINE HYDROCHLORIDE 2 MG/ML
INJECTION, SOLUTION EPIDURAL; INFILTRATION
Status: DISCONTINUED | OUTPATIENT
Start: 2024-10-29 | End: 2024-10-29

## 2024-10-29 RX ORDER — TRANEXAMIC ACID 10 MG/ML
1000 INJECTION, SOLUTION INTRAVENOUS EVERY 30 MIN PRN
Status: CANCELLED | OUTPATIENT
Start: 2024-10-29

## 2024-10-29 RX ORDER — MISOPROSTOL 100 UG/1
800 TABLET ORAL ONCE AS NEEDED
Status: CANCELLED | OUTPATIENT
Start: 2024-10-29 | End: 2036-03-27

## 2024-10-29 RX ORDER — MUPIROCIN 20 MG/G
OINTMENT TOPICAL 2 TIMES DAILY
Status: CANCELLED | OUTPATIENT
Start: 2024-10-29 | End: 2024-11-03

## 2024-10-29 RX ORDER — PRENATAL WITH FERROUS FUM AND FOLIC ACID 3080; 920; 120; 400; 22; 1.84; 3; 20; 10; 1; 12; 200; 27; 25; 2 [IU]/1; [IU]/1; MG/1; [IU]/1; MG/1; MG/1; MG/1; MG/1; MG/1; MG/1; UG/1; MG/1; MG/1; MG/1; MG/1
1 TABLET ORAL DAILY
Status: DISCONTINUED | OUTPATIENT
Start: 2024-10-29 | End: 2024-11-01 | Stop reason: HOSPADM

## 2024-10-29 RX ORDER — IBUPROFEN 600 MG/1
600 TABLET ORAL EVERY 6 HOURS
Status: DISCONTINUED | OUTPATIENT
Start: 2024-10-29 | End: 2024-11-01 | Stop reason: HOSPADM

## 2024-10-29 RX ORDER — ONDANSETRON 4 MG/1
8 TABLET, ORALLY DISINTEGRATING ORAL EVERY 8 HOURS PRN
Status: CANCELLED | OUTPATIENT
Start: 2024-10-29

## 2024-10-29 RX ORDER — ACETAMINOPHEN 10 MG/ML
INJECTION, SOLUTION INTRAVENOUS
Status: DISCONTINUED | OUTPATIENT
Start: 2024-10-29 | End: 2024-10-29

## 2024-10-29 RX ORDER — OXYTOCIN-SODIUM CHLORIDE 0.9% IV SOLN 30 UNIT/500ML 30-0.9/5 UT/ML-%
95 SOLUTION INTRAVENOUS CONTINUOUS PRN
Status: DISCONTINUED | OUTPATIENT
Start: 2024-10-29 | End: 2024-11-01 | Stop reason: HOSPADM

## 2024-10-29 RX ORDER — EPHEDRINE SULFATE 50 MG/ML
INJECTION, SOLUTION INTRAVENOUS
Status: DISCONTINUED | OUTPATIENT
Start: 2024-10-29 | End: 2024-10-29

## 2024-10-29 RX ORDER — OXYTOCIN-SODIUM CHLORIDE 0.9% IV SOLN 30 UNIT/500ML 30-0.9/5 UT/ML-%
30 SOLUTION INTRAVENOUS ONCE AS NEEDED
Status: CANCELLED | OUTPATIENT
Start: 2024-10-29 | End: 2036-03-27

## 2024-10-29 RX ORDER — DEXAMETHASONE SODIUM PHOSPHATE 4 MG/ML
INJECTION, SOLUTION INTRA-ARTICULAR; INTRALESIONAL; INTRAMUSCULAR; INTRAVENOUS; SOFT TISSUE
Status: DISCONTINUED | OUTPATIENT
Start: 2024-10-29 | End: 2024-10-29

## 2024-10-29 RX ORDER — OXYTOCIN-SODIUM CHLORIDE 0.9% IV SOLN 30 UNIT/500ML 30-0.9/5 UT/ML-%
10 SOLUTION INTRAVENOUS ONCE AS NEEDED
OUTPATIENT
Start: 2024-10-29 | End: 2036-03-27

## 2024-10-29 RX ORDER — SODIUM CHLORIDE, SODIUM LACTATE, POTASSIUM CHLORIDE, CALCIUM CHLORIDE 600; 310; 30; 20 MG/100ML; MG/100ML; MG/100ML; MG/100ML
INJECTION, SOLUTION INTRAVENOUS CONTINUOUS
Status: DISCONTINUED | OUTPATIENT
Start: 2024-10-29 | End: 2024-10-31

## 2024-10-29 RX ORDER — MUPIROCIN 20 MG/G
OINTMENT TOPICAL
OUTPATIENT
Start: 2024-10-29

## 2024-10-29 RX ORDER — METHYLERGONOVINE MALEATE 0.2 MG/ML
200 INJECTION INTRAVENOUS
Status: DISCONTINUED | OUTPATIENT
Start: 2024-10-29 | End: 2024-10-31

## 2024-10-29 RX ORDER — OXYTOCIN-SODIUM CHLORIDE 0.9% IV SOLN 30 UNIT/500ML 30-0.9/5 UT/ML-%
95 SOLUTION INTRAVENOUS ONCE AS NEEDED
Status: CANCELLED | OUTPATIENT
Start: 2024-10-29 | End: 2036-03-27

## 2024-10-29 RX ORDER — ROPIVACAINE HYDROCHLORIDE 2 MG/ML
INJECTION, SOLUTION EPIDURAL; INFILTRATION; PERINEURAL
Status: COMPLETED
Start: 2024-10-29 | End: 2024-10-29

## 2024-10-29 RX ORDER — SODIUM CITRATE AND CITRIC ACID MONOHYDRATE 334; 500 MG/5ML; MG/5ML
30 SOLUTION ORAL
Status: DISCONTINUED | OUTPATIENT
Start: 2024-10-29 | End: 2024-11-01 | Stop reason: HOSPADM

## 2024-10-29 RX ORDER — HYDROMORPHONE HYDROCHLORIDE 1 MG/ML
1 INJECTION, SOLUTION INTRAMUSCULAR; INTRAVENOUS; SUBCUTANEOUS EVERY 4 HOURS PRN
Status: CANCELLED | OUTPATIENT
Start: 2024-10-29

## 2024-10-29 RX ORDER — DOCUSATE SODIUM 100 MG/1
200 CAPSULE, LIQUID FILLED ORAL 2 TIMES DAILY
Status: DISCONTINUED | OUTPATIENT
Start: 2024-10-29 | End: 2024-11-01 | Stop reason: HOSPADM

## 2024-10-29 RX ORDER — DIPHENHYDRAMINE HCL 25 MG
25 CAPSULE ORAL EVERY 4 HOURS PRN
Status: DISCONTINUED | OUTPATIENT
Start: 2024-10-29 | End: 2024-11-01 | Stop reason: HOSPADM

## 2024-10-29 RX ORDER — MISOPROSTOL 100 UG/1
800 TABLET ORAL ONCE AS NEEDED
Status: CANCELLED | OUTPATIENT
Start: 2024-10-29

## 2024-10-29 RX ORDER — HYDROCORTISONE 25 MG/G
CREAM TOPICAL 3 TIMES DAILY PRN
Status: DISCONTINUED | OUTPATIENT
Start: 2024-10-29 | End: 2024-11-01 | Stop reason: HOSPADM

## 2024-10-29 RX ORDER — ONDANSETRON HYDROCHLORIDE 2 MG/ML
INJECTION, SOLUTION INTRAVENOUS
Status: DISCONTINUED | OUTPATIENT
Start: 2024-10-29 | End: 2024-10-29

## 2024-10-29 RX ADMIN — PHENYLEPHRINE HYDROCHLORIDE 100 MCG: 10 INJECTION, SOLUTION INTRAVENOUS at 03:10

## 2024-10-29 RX ADMIN — FENTANYL CITRATE 50 MCG: 50 INJECTION, SOLUTION INTRAMUSCULAR; INTRAVENOUS at 02:10

## 2024-10-29 RX ADMIN — CLINDAMYCIN PHOSPHATE 900 MG: 900 INJECTION, SOLUTION INTRAVENOUS at 02:10

## 2024-10-29 RX ADMIN — ROPIVACAINE HYDROCHLORIDE 5 MG: 2 INJECTION, SOLUTION EPIDURAL; INFILTRATION at 12:10

## 2024-10-29 RX ADMIN — ROPIVACAINE HYDROCHLORIDE 5 MG: 2 INJECTION, SOLUTION EPIDURAL; INFILTRATION at 01:10

## 2024-10-29 RX ADMIN — FENTANYL CITRATE 50 MCG: 50 INJECTION, SOLUTION INTRAMUSCULAR; INTRAVENOUS at 03:10

## 2024-10-29 RX ADMIN — Medication 4 MILLI-UNITS/MIN: at 09:10

## 2024-10-29 RX ADMIN — OXYCODONE HYDROCHLORIDE AND ACETAMINOPHEN 1 TABLET: 10; 325 TABLET ORAL at 07:10

## 2024-10-29 RX ADMIN — MISOPROSTOL 25 MCG: 100 TABLET ORAL at 05:10

## 2024-10-29 RX ADMIN — PHENYLEPHRINE HYDROCHLORIDE 100 MCG: 10 INJECTION, SOLUTION INTRAVENOUS at 02:10

## 2024-10-29 RX ADMIN — CLINDAMYCIN PHOSPHATE 900 MG: 900 INJECTION, SOLUTION INTRAVENOUS at 10:10

## 2024-10-29 RX ADMIN — EPHEDRINE SULFATE 50 MG: 50 INJECTION, SOLUTION INTRAVENOUS at 02:10

## 2024-10-29 RX ADMIN — ONDANSETRON 4 MG: 2 INJECTION INTRAMUSCULAR; INTRAVENOUS at 02:10

## 2024-10-29 RX ADMIN — TERBUTALINE SULFATE 0.25 MG: 1 INJECTION SUBCUTANEOUS at 01:10

## 2024-10-29 RX ADMIN — ROPIVACAINE HYDROCHLORIDE 12 ML/HR: 2 INJECTION, SOLUTION EPIDURAL; INFILTRATION at 01:10

## 2024-10-29 RX ADMIN — Medication 300 ML/HR: at 02:10

## 2024-10-29 RX ADMIN — MUPIROCIN: 20 OINTMENT TOPICAL at 09:10

## 2024-10-29 RX ADMIN — GENTAMICIN SULFATE 410 MG: 40 INJECTION, SOLUTION INTRAMUSCULAR; INTRAVENOUS at 02:10

## 2024-10-29 RX ADMIN — DOCUSATE SODIUM 200 MG: 100 CAPSULE, LIQUID FILLED ORAL at 09:10

## 2024-10-29 RX ADMIN — MISOPROSTOL 25 MCG: 100 TABLET ORAL at 01:10

## 2024-10-29 RX ADMIN — OXYTOCIN 10 UNITS: 10 INJECTION INTRAVENOUS at 02:10

## 2024-10-29 RX ADMIN — IBUPROFEN 600 MG: 600 TABLET, FILM COATED ORAL at 06:10

## 2024-10-29 RX ADMIN — ACETAMINOPHEN 1000 MG: 1000 INJECTION, SOLUTION INTRAVENOUS at 02:10

## 2024-10-29 RX ADMIN — PHENYLEPHRINE HYDROCHLORIDE 200 MCG: 10 INJECTION, SOLUTION INTRAVENOUS at 02:10

## 2024-10-29 RX ADMIN — LIDOCAINE HYDROCHLORIDE 2 ML: 10 INJECTION, SOLUTION INFILTRATION; PERINEURAL at 12:10

## 2024-10-29 RX ADMIN — AZITHROMYCIN MONOHYDRATE 500 MG: 500 INJECTION, POWDER, LYOPHILIZED, FOR SOLUTION INTRAVENOUS at 02:10

## 2024-10-29 RX ADMIN — DEXAMETHASONE SODIUM PHOSPHATE 4 MG: 4 INJECTION, SOLUTION INTRA-ARTICULAR; INTRALESIONAL; INTRAMUSCULAR; INTRAVENOUS; SOFT TISSUE at 02:10

## 2024-10-29 NOTE — OP NOTE
DATE OF PROCEDURE: 10/29/2024    PRE-OP DIAGNOSIS:  39w1d  Non-reactive NST (non-stress test) [O28.8]    POST-OP DIAGNOSIS:  39w1d  Post-Op Diagnosis Codes:     * Maternal obesity affecting pregnancy, antepartum [O99.210]     * Non-reassuring fetal heart rate or rhythm affecting management of mother [O36.8390]    PROCEDURE:   Procedure(s) (LRB):   SECTION (N/A)    SURGEON: Ronaldo Roper MD    ANESTHESIA: Spinal    SPECIMEN: None    ESTIMATED BLOOD LOSS:   700 cc    After consents were reviewed patient was taken to the operating room where a time-out was held.  She was placed on the OR table in the dorsal supine position.  She was prepped and draped in standard sterile fashion.  A Pfannenstiel skin incision was made and carried down to the underlying fascia.  The fascia was incised and rectus muscles were dissected superiorly and inferiorly.  Muscles were  in the midline the peritoneum was tented up and entered sharply with Castillo scissors.  The vesicouterine peritoneal fold was incised with Metzenbaum scissors and a bladder flap was created.  The bladder was deflected with bladder blade.  A low-transverse hysterotomy was made the fetal head was grasped and brought through the hysterotomy.  The remainder of the baby was delivered and placed on the mother's abdomen the cord was milked clamped and cut and baby was handed off to awaiting nurses for initial resuscitation.  Apgar scores were 9 and 9 at 1 and 5 minutes, respectively.  A section of cord was obtained for cord gases.  The placenta was delivered spontaneously intact and handed off.  The uterus was exteriorized and cleaned with a dry lap.  The hysterotomy was then closed with a running locking layer of 0 Monocryl and imbricated with a 2nd layer of 0 Monocryl.  The pelvis was irrigated and freed of all clots and debris.  After ensuring hemostasis muscles and peritoneum were closed with interrupted sutures of 0 chromic gut.  Fascia was closed with a  PDS suture. Subcutaneous tissues were irrigated, and [place]cca's fascia was closed with 3-0 plain gut.  Skin was closed with a 3-0 undyed Vicryl in a subcuticular fashion. Dermabond.  Patient was awakened and taken to the recovery room in stable condition having tolerated the procedure very well.  Sponge, lap, needle count correct at end of procedure.    I agree with anesthesia's plan of care.

## 2024-10-29 NOTE — INTERVAL H&P NOTE
"The patient has been examined and the H&P has been reviewed:    I concur with the findings and changes have been noted since the H&P was written:    24 y.o. female  at 39w1d presented for induction due to fetal heart rate deceleration noted during NST in clinic.  GBS positive and was started clindamycin for GBS prophylaxis as group B strep culture was sensitive to clinda and erythromycin.  She received Cytotec overnight.  Fetal testing has been largely reactive after hydration.  She has been orally hydrating.  Pain is moderate and with contractions this morning.        BP (!) 109/56   Pulse 70   Temp 98.2 °F (36.8 °C)   Resp 18   Ht 5' 7" (1.702 m)   Wt 112.5 kg (248 lb 0.3 oz)   SpO2 98%   Breastfeeding No   BMI 38.85 kg/m²   Gen: NAD  Abd: Gravid, NT  Ext: no CCE  Pelvis: NEFG  Cvx: 2/80/-2.  AROM with small straw colored fluid noted.    NST:   - Baseline: 140  - Variability: moderate  - Accelerations: PRESENT: 20X20   - Decelerations: ABSENT  - Time on monitor: 18 hours  - Category: .reactive, category 1    [place]:   - CTX: PRESENT: irregular    Active Hospital Problems    Diagnosis  POA    *Fetal heart rate decelerations affecting management of mother [O36.8390]  Yes    GBS (group B Streptococcus carrier), +RV culture, currently pregnant [O99.820]  Not Applicable    Fundal height high for dates [Z34.90]  Not Applicable    Obesity affecting pregnancy in third trimester [O99.213]  Yes      Resolved Hospital Problems   No resolved problems to display.     AROM at 07:45  Fetal testing improved after hydration.    Continuous maternal fetal monitoring.    Continue clindamycin for GBS prophylaxis.    Continue her hydration.    Monitor for signs symptoms fetal distress.    Pitocin per protocol.    Epidural when ready.    Anticipate vaginal delivery    "

## 2024-10-30 PROBLEM — D62 ACUTE BLOOD LOSS ANEMIA: Status: ACTIVE | Noted: 2024-10-30

## 2024-10-30 PROBLEM — O99.820 GBS (GROUP B STREPTOCOCCUS CARRIER), +RV CULTURE, CURRENTLY PREGNANT: Status: RESOLVED | Noted: 2024-10-29 | Resolved: 2024-10-30

## 2024-10-30 PROBLEM — Z34.90 FUNDAL HEIGHT HIGH FOR DATES: Status: RESOLVED | Noted: 2024-09-03 | Resolved: 2024-10-30

## 2024-10-30 PROBLEM — Z3A.36 36 WEEKS GESTATION OF PREGNANCY: Status: RESOLVED | Noted: 2024-10-29 | Resolved: 2024-10-30

## 2024-10-30 PROBLEM — O36.8390 FETAL HEART RATE DECELERATIONS AFFECTING MANAGEMENT OF MOTHER: Status: RESOLVED | Noted: 2024-10-28 | Resolved: 2024-10-30

## 2024-10-30 LAB
BASOPHILS # BLD AUTO: 0.02 X10(3)/MCL (ref 0.01–0.08)
BASOPHILS NFR BLD AUTO: 0.1 % (ref 0.1–1.2)
EOSINOPHIL # BLD AUTO: 0.02 X10(3)/MCL (ref 0.04–0.36)
EOSINOPHIL NFR BLD AUTO: 0.1 % (ref 0.7–7)
ERYTHROCYTE [DISTWIDTH] IN BLOOD BY AUTOMATED COUNT: 12.3 % (ref 11–14.5)
HCT VFR BLD AUTO: 31.1 % (ref 36–48)
HGB BLD-MCNC: 10.3 G/DL (ref 11.8–16)
IMM GRANULOCYTES # BLD AUTO: 0.11 X10(3)/MCL (ref 0–0.03)
IMM GRANULOCYTES NFR BLD AUTO: 0.7 % (ref 0–0.5)
LYMPHOCYTES # BLD AUTO: 2.04 X10(3)/MCL (ref 1.16–3.74)
LYMPHOCYTES NFR BLD AUTO: 12.5 % (ref 20–55)
MCH RBC QN AUTO: 29.6 PG (ref 27–34)
MCHC RBC AUTO-ENTMCNC: 33.1 G/DL (ref 31–37)
MCV RBC AUTO: 89.4 FL (ref 79–99)
MONOCYTES # BLD AUTO: 1.64 X10(3)/MCL (ref 0.24–0.36)
MONOCYTES NFR BLD AUTO: 10.1 % (ref 4.7–12.5)
NEUTROPHILS # BLD AUTO: 12.47 X10(3)/MCL (ref 1.56–6.13)
NEUTROPHILS NFR BLD AUTO: 76.5 % (ref 37–73)
NRBC BLD AUTO-RTO: 0 %
PLATELET # BLD AUTO: 153 X10(3)/MCL (ref 140–371)
PMV BLD AUTO: 12.8 FL (ref 9.4–12.4)
RBC # BLD AUTO: 3.48 X10(6)/MCL (ref 4–5.1)
WBC # BLD AUTO: 16.3 X10(3)/MCL (ref 4–11.5)

## 2024-10-30 PROCEDURE — 25000003 PHARM REV CODE 250: Performed by: OBSTETRICS & GYNECOLOGY

## 2024-10-30 PROCEDURE — 99231 SBSQ HOSP IP/OBS SF/LOW 25: CPT | Mod: ,,, | Performed by: OBSTETRICS & GYNECOLOGY

## 2024-10-30 PROCEDURE — 11000001 HC ACUTE MED/SURG PRIVATE ROOM

## 2024-10-30 PROCEDURE — 85025 COMPLETE CBC W/AUTO DIFF WBC: CPT | Performed by: OBSTETRICS & GYNECOLOGY

## 2024-10-30 PROCEDURE — 36415 COLL VENOUS BLD VENIPUNCTURE: CPT | Performed by: OBSTETRICS & GYNECOLOGY

## 2024-10-30 RX ADMIN — IBUPROFEN 600 MG: 600 TABLET, FILM COATED ORAL at 12:10

## 2024-10-30 RX ADMIN — MUPIROCIN: 20 OINTMENT TOPICAL at 08:10

## 2024-10-30 RX ADMIN — IBUPROFEN 600 MG: 600 TABLET, FILM COATED ORAL at 06:10

## 2024-10-30 RX ADMIN — DOCUSATE SODIUM 200 MG: 100 CAPSULE, LIQUID FILLED ORAL at 09:10

## 2024-10-30 RX ADMIN — OXYCODONE HYDROCHLORIDE AND ACETAMINOPHEN 1 TABLET: 10; 325 TABLET ORAL at 03:10

## 2024-10-30 RX ADMIN — DOCUSATE SODIUM 200 MG: 100 CAPSULE, LIQUID FILLED ORAL at 08:10

## 2024-10-30 RX ADMIN — HYDROCODONE BITARTRATE AND ACETAMINOPHEN 1 TABLET: 7.5; 325 TABLET ORAL at 09:10

## 2024-10-30 RX ADMIN — MUPIROCIN: 20 OINTMENT TOPICAL at 09:10

## 2024-10-30 NOTE — PROGRESS NOTES
[facility]  Obstetrics  Postpartum Progress Note    Patient Name: Anastasiia Altman  MRN: [id number]  Admission Date: 10/28/2024  Hospital Length of Stay: 2 days  Attending Physician: Ronaldo Roper MD  Primary Care Provider: Clari Primary Doctor    Subjective:     Principal Problem:Status post  section    Hospital course: S/p  POD 1    Interval History:     She is doing well this morning. She is tolerating a regular diet without nausea or vomiting. She is voiding spontaneously. She is ambulating. She has not passed flatus, and has not a BM. Vaginal bleeding is mild. She denies fever or chills. Abdominal pain is moderate and controlled with oral medications. She Is breastfeeding.       Objective:     Vital Signs (Most Recent):  Temp: 97.9 °F (36.6 °C) (10/30/24 07)  Pulse: 98 (10/30/24 0705)  Resp: 18 (10/30/24 0337)  BP: 112/68 (10/30/24 0705)  SpO2: 99 % (10/29/24 190) Vital Signs (24h Range):  Temp:  [97.4 °F (36.3 °C)-99.1 °F (37.3 °C)] 97.9 °F (36.6 °C)  Pulse:  [] 98  Resp:  [18-20] 18  SpO2:  [77 %-100 %] 99 %  BP: (104-155)/(58-92) 112/68     Weight: 112.5 kg (248 lb 0.3 oz)  Body mass index is 38.85 kg/m².      Intake/Output Summary (Last 24 hours) at 10/30/2024 0926  Last data filed at 10/30/2024 0000  Gross per 24 hour   Intake 221.5 ml   Output 1900 ml   Net -1678.5 ml       Physical Exam:   Constitutional: She is oriented to person, place, and time. She appears well-developed and well-nourished. No distress.        Pulmonary/Chest: Effort normal. No respiratory distress.        Abdominal: Soft. She exhibits abdominal incision (bandage in place). She exhibits no distension. There is abdominal tenderness (appropriate). There is no rebound and no guarding.                 Neurological: She is alert and oriented to person, place, and time.    Skin: Skin is warm and dry.    Psychiatric: She has a normal mood and affect. Her behavior is normal. Judgment and  thought content normal.       Significant Labs:  Lab Results   Component Value Date    GROUPTRH B POS 10/28/2024    HEPBSAG Negative 10/15/2024     Recent Labs   Lab 10/30/24  0558   HGB 10.3*   HCT 31.1*       I have personallly reviewed all pertinent lab results from the last 24 hours.  Recent Lab Results         10/30/24  0558   10/29/24  2108        Baso # 0.02         Basophil % 0.1         Eos # 0.02         Eos % 0.1         Hematocrit 31.1   34.9       Hemoglobin 10.3   11.8       Immature Grans (Abs) 0.11         Immature Granulocytes 0.7         Lymph # 2.04         LYMPH % 12.5         MCH 29.6         MCHC 33.1         MCV 89.4         Mono # 1.64         Mono % 10.1         MPV 12.8         Neut # 12.47         Neut % 76.5         nRBC 0.0         Platelet Count 153         RBC 3.48         RDW 12.3         WBC 16.30                 Assessment/Plan:     24 y.o. female  at 39w2d for:    Active Diagnoses:    Diagnosis Date Noted POA    PRINCIPAL PROBLEM:  Status post  section [Z98.891] 10/29/2024 Not Applicable    Acute blood loss anemia [D62] 10/30/2024 No    Obesity affecting pregnancy in third trimester [O99.213] 2024 Yes      Problems Resolved During this Admission:    Diagnosis Date Noted Date Resolved POA    GBS (group B Streptococcus carrier), +RV culture, currently pregnant [O99.820] 10/29/2024 10/30/2024 Not Applicable    36 weeks gestation of pregnancy [Z3A.36] 10/29/2024 10/30/2024 Not Applicable    Fetal heart rate decelerations affecting management of mother [O36.8390] 10/28/2024 10/30/2024 Yes    Fundal height high for dates [Z34.90] 2024 10/30/2024 Not Applicable       Routine postpartum care.    Patient encouraged to ambulate.    Pain control.    Plan for Lovenox at 12:00 p.m. for DVT prophylaxis if stable.        Disposition: As patient meets milestones, will plan to discharge tomorrow or the following day if stable.    AGUSTÍN TAPIA MD  Obstetrics  [facility]

## 2024-10-31 PROBLEM — F17.200 TOBACCO DEPENDENCY: Status: ACTIVE | Noted: 2024-10-31

## 2024-10-31 PROCEDURE — 11000001 HC ACUTE MED/SURG PRIVATE ROOM

## 2024-10-31 PROCEDURE — 25000003 PHARM REV CODE 250: Performed by: OBSTETRICS & GYNECOLOGY

## 2024-10-31 PROCEDURE — 99231 SBSQ HOSP IP/OBS SF/LOW 25: CPT | Mod: ,,,

## 2024-10-31 RX ADMIN — OXYCODONE HYDROCHLORIDE AND ACETAMINOPHEN 1 TABLET: 10; 325 TABLET ORAL at 08:10

## 2024-10-31 RX ADMIN — PRENATAL VITAMINS-IRON FUMARATE 27 MG IRON-FOLIC ACID 0.8 MG TABLET 1 TABLET: at 08:10

## 2024-10-31 RX ADMIN — IBUPROFEN 600 MG: 600 TABLET, FILM COATED ORAL at 01:10

## 2024-10-31 RX ADMIN — MUPIROCIN: 20 OINTMENT TOPICAL at 08:10

## 2024-10-31 RX ADMIN — IBUPROFEN 600 MG: 600 TABLET, FILM COATED ORAL at 05:10

## 2024-10-31 RX ADMIN — DOCUSATE SODIUM 200 MG: 100 CAPSULE, LIQUID FILLED ORAL at 09:10

## 2024-10-31 RX ADMIN — SIMETHICONE 80 MG: 80 TABLET, CHEWABLE ORAL at 12:10

## 2024-10-31 RX ADMIN — DOCUSATE SODIUM 200 MG: 100 CAPSULE, LIQUID FILLED ORAL at 08:10

## 2024-10-31 RX ADMIN — IBUPROFEN 600 MG: 600 TABLET, FILM COATED ORAL at 12:10

## 2024-10-31 RX ADMIN — MUPIROCIN: 20 OINTMENT TOPICAL at 09:10

## 2024-10-31 NOTE — PROGRESS NOTES
PostPartum Progress Note        Subjective:      Post-Partum Day #2 after primary CS  delivery d/t NRFHTs.    Patient is without complaints. Lochia decreasing. Breast feeding. Pain is well controlled. Patient is ambulating and urinating without complications. Tolerating full regular diet. Pain well controlled. Overall mother and baby are doing well.     Objective:      Temp:  [97.7 °F (36.5 °C)-98.6 °F (37 °C)] 98 °F (36.7 °C)  Pulse:  [70-94] 86  Resp:  [18-20] 20  SpO2:  [99 %] 99 %  BP: (105-139)/(66-82) 120/78  No intake or output data in the 24 hours ending 10/31/24 1516  Body mass index is 38.85 kg/m².    General: no acute distress  Abdomen: soft, non-tender, non-distended; Fundus firm and at the umbilicus. Incision: CDI, appropriate TTP. Medipore dressing in place    Extremities: non-tender, symmetric, trace edema    Lab Results   Component Value Date/Time    GROUPTRH B POS 10/28/2024 11:01 AM    ABORH B POS 2024 09:03 AM    ABSCREEN NEG 2024 09:03 AM     Recent Results (from the past 2 weeks)   CBC with Differential    Collection Time: 10/30/24  5:58 AM   Result Value Ref Range    WBC 16.30 (H) 4.00 - 11.50 x10(3)/mcL    Hgb 10.3 (L) 11.8 - 16.0 g/dL    Hct 31.1 (L) 36.0 - 48.0 %    Platelet 153 140 - 371 x10(3)/mcL          Assessment:     24 y.o.  S/P CS Post-Partum Day #2  - Doing Well      Plan:     1. Continue routine postpartum care ambulation encouraged   2. Plan for D/C in AM-pt desires to stay one more night  3. Breast Feeding encouraged, lactation consult  4. Rhogam per protocol

## 2024-11-01 VITALS
RESPIRATION RATE: 18 BRPM | DIASTOLIC BLOOD PRESSURE: 80 MMHG | TEMPERATURE: 98 F | HEIGHT: 67 IN | HEART RATE: 76 BPM | WEIGHT: 248 LBS | SYSTOLIC BLOOD PRESSURE: 121 MMHG | BODY MASS INDEX: 38.92 KG/M2 | OXYGEN SATURATION: 99 %

## 2024-11-01 PROCEDURE — 25000003 PHARM REV CODE 250: Performed by: OBSTETRICS & GYNECOLOGY

## 2024-11-01 RX ORDER — HYDROCODONE BITARTRATE AND ACETAMINOPHEN 7.5; 325 MG/1; MG/1
1 TABLET ORAL EVERY 6 HOURS PRN
Qty: 10 TABLET | Refills: 0 | Status: SHIPPED | OUTPATIENT
Start: 2024-11-01

## 2024-11-01 RX ORDER — TRIPROLIDINE/PSEUDOEPHEDRINE 2.5MG-60MG
400 TABLET ORAL EVERY 6 HOURS PRN
Qty: 473 ML | Refills: 0 | Status: SHIPPED | OUTPATIENT
Start: 2024-11-01

## 2024-11-01 RX ORDER — HYDROCODONE BITARTRATE AND ACETAMINOPHEN 7.5; 325 MG/1; MG/1
1 TABLET ORAL EVERY 4 HOURS PRN
Qty: 10 TABLET | Refills: 0 | Status: SHIPPED | OUTPATIENT
Start: 2024-11-01

## 2024-11-01 RX ORDER — AMOXICILLIN 250 MG
1 CAPSULE ORAL NIGHTLY PRN
Qty: 90 TABLET | Refills: 1 | Status: SHIPPED | OUTPATIENT
Start: 2024-11-01 | End: 2025-04-30

## 2024-11-01 RX ORDER — TRIPROLIDINE/PSEUDOEPHEDRINE 2.5MG-60MG
400 TABLET ORAL EVERY 6 HOURS PRN
Qty: 473 ML | Refills: 0 | Status: SHIPPED | OUTPATIENT
Start: 2024-11-01 | End: 2024-12-01

## 2024-11-01 RX ORDER — AMOXICILLIN 250 MG
1 CAPSULE ORAL DAILY
Qty: 90 TABLET | Refills: 1 | Status: SHIPPED | OUTPATIENT
Start: 2024-11-01 | End: 2025-04-30

## 2024-11-01 RX ADMIN — IBUPROFEN 600 MG: 600 TABLET, FILM COATED ORAL at 12:11

## 2024-11-01 RX ADMIN — OXYCODONE HYDROCHLORIDE AND ACETAMINOPHEN 1 TABLET: 10; 325 TABLET ORAL at 02:11

## 2024-11-01 RX ADMIN — MUPIROCIN: 20 OINTMENT TOPICAL at 09:11

## 2024-11-01 RX ADMIN — PRENATAL VITAMINS-IRON FUMARATE 27 MG IRON-FOLIC ACID 0.8 MG TABLET 1 TABLET: at 08:11

## 2024-11-01 RX ADMIN — IBUPROFEN 600 MG: 600 TABLET, FILM COATED ORAL at 05:11

## 2024-11-01 RX ADMIN — DOCUSATE SODIUM 200 MG: 100 CAPSULE, LIQUID FILLED ORAL at 08:11

## 2024-11-01 NOTE — DISCHARGE SUMMARY
Delivery Discharge Summary  Obstetrics        Discharge Physician: Trupti Lockhart MD    Admission date: 10/28/2024  Discharge date: 2024    Admit Dx:   Patient Active Problem List   Diagnosis    Obesity affecting pregnancy in third trimester    Nuchal cord, not applicable or unspecified fetus    At higher risk for preeclampsia complications of intrauterine pregnancy (IUP)    Positive depression screening    Trichomonal vaginitis during pregnancy in third trimester    Status post  section    Acute blood loss anemia    Tobacco dependency     Discharge Dx:  s/p primary c/s      Hospital Course:  Anastasiia Altman is a 24 y.o. now  who was admitted on 10/28/2024 for delivery. Patient delivered a viable  male. Please see delivery note for further details. Pt was in stable condition post delivery.  Her postpartum course was uncomplicated. On the date of discharge, patient's pain is controlled with oral pain medications. She is tolerating ambulation without SOB or CP, and PO diet without N/V. Reported lochia is within the normal range. Pt in stable condition and ready for discharge. Breastfeeding without difficulty at this time.       DISCHARGE PHYSICAL EXAM  Temp:  [97.4 °F (36.3 °C)-98.6 °F (37 °C)] 98.2 °F (36.8 °C)  Pulse:  [86-97] 90  Resp:  [18-20] 18  SpO2:  [99 %] 99 %  BP: (112-123)/(65-87) 123/87  No intake or output data in the 24 hours ending 24 1202  Body mass index is 38.85 kg/m².    General: no acute distress  Abdomen: soft, non-tender, non-distended; Fundus firm periumbilical  Inc: clean / dry  Extremities: non-tender, symmetric, trace BLE edema, neg Neo's Bilateral     Pertinent studies:  Lab Results   Component Value Date/Time    GROUPTRH B POS 10/28/2024 11:01 AM    ABORH B POS 2024 09:03 AM    ABSCREEN NEG 2024 09:03 AM     Recent Results (from the past 2 weeks)   CBC with Differential    Collection Time: 10/30/24  5:58 AM   Result Value Ref Range    WBC  16.30 (H) 4.00 - 11.50 x10(3)/mcL    Hgb 10.3 (L) 11.8 - 16.0 g/dL    Hct 31.1 (L) 36.0 - 48.0 %    Platelet 153 140 - 371 x10(3)/mcL         Disposition: To home, self care    Follow Up:  1 week for incision check     Patient Instructions:   1. Call the office for any bleeding >2 pads/hour for >2 hours, temperature >100.4, pain that is uncontrolled with medications, or for any other concerns.  2. Pre Eclampsia precautions  3. No driving while on narcotics.        Trupti Lockhart MD

## 2024-11-01 NOTE — PROGRESS NOTES
PostPartum Progress Note        Subjective:      Post-Partum Day #3 after uncomplicated CS  delivery.    Patient is without complaints. Lochia decreasing. Breast feeding. Pain is well controlled. Patient is ambulating and urinating without complications. Tolerating full regular diet. Pain well controlled. Overall mother and baby are doing well. Pos flatus     Objective:      Temp:  [97.4 °F (36.3 °C)-98.6 °F (37 °C)] 98.2 °F (36.8 °C)  Pulse:  [86-97] 90  Resp:  [18-20] 18  SpO2:  [99 %] 99 %  BP: (112-123)/(65-87) 123/87  No intake or output data in the 24 hours ending 24 1200  Body mass index is 38.85 kg/m².    General: no acute distress  Abdomen: soft, non-tender, non-distended; Fundus firm and at the umbilicus. Incision: CDI, appropriate TTP.     Extremities: non-tender, symmetric, trace edema    Lab Results   Component Value Date/Time    GROUPTRH B POS 10/28/2024 11:01 AM    ABORH B POS 2024 09:03 AM    ABSCREEN NEG 2024 09:03 AM     Recent Results (from the past 2 weeks)   CBC with Differential    Collection Time: 10/30/24  5:58 AM   Result Value Ref Range    WBC 16.30 (H) 4.00 - 11.50 x10(3)/mcL    Hgb 10.3 (L) 11.8 - 16.0 g/dL    Hct 31.1 (L) 36.0 - 48.0 %    Platelet 153 140 - 371 x10(3)/mcL          Assessment:     24 y.o.  S/P CS Post-Partum Day #3  - Doing Well      Plan:     1. Continue routine postpartum care ambulation encouraged   2. Plan for D/C   3. Breast Feeding encouraged, lactation consult  4. Rhogam per protocol  5. F/u in 1 week for incision check w primary OB     S Chantelle FAGAN   OB Locums

## 2024-11-19 ENCOUNTER — PATIENT MESSAGE (OUTPATIENT)
Dept: RESEARCH | Facility: HOSPITAL | Age: 24
End: 2024-11-19
Payer: MEDICAID

## 2024-11-21 ENCOUNTER — LACTATION ENCOUNTER (OUTPATIENT)
Dept: OBSTETRICS AND GYNECOLOGY | Facility: HOSPITAL | Age: 24
End: 2024-11-21

## 2024-11-21 NOTE — LACTATION NOTE
This note was copied from a baby's chart.  DATE:24    Maternal Significant History/Delivery: uncomplicated  Medications Currently Taking:  PNV, lactation supplements      Infant Name:Donny Monk Sex: Male :10/29 Age of life:10D  Pediatrician: José    Mother has hypoplastic breast with minimal milk output, mother reports pumping every 2 hours with less than 1 oz output each pumping session. Baby requiring supplementation due to weight loss from inadequate milk supply. Mother educated on using supplemental nursing system in place of triple feeding to ensure adequate milk for weight gain. Mother educated on supplemental nursing system and shown correct use. Mother given supplies to use at home to nurse while supplementing.         WEIGHTED FEED:  Birth Weight: 7#11  DC Weight:7#8  Pedi Weight:6  Pre-Feed Weight :7#15 ( Diapered)    Right Breast   Post-Feed Weight:7#15.9   Amount transferred: 0.2oz  Using supplemental nursing system: 8#2  Total transfer: 2oz     #wet in 24hr: 6+  #dirty in 24hr:4  Color/consistency:yellow/seedy    How would you categorize baby's level of alertness? Awake / Alert / Active     Supplementation? Y  What/How much each day:every feeding, 2-3 oz      Pump? Y  -Type/Frequency:every 2 hours after feeds.   -Flange Size: 24mm    Returning to work/school? N  If yes:  -Date Returning:_________ Full/Part?  -Hours of separation including travel: _____/day  -Supportive employer:Y/N  -Accessible place to store milk? Y/N  -Access to private place to pump? Y/N  -Accessible place to clean pump equipment? Y/N    ASSESSMENT:  Milk Supply: Low milk supply   Nipple/Areola Assessment:  -Everted   - Everts with stimulation   -Pain/soreness: denies Location: ______  -Engorgement: no denies any fullness Location: bilat  -Blistered/cracked/bleeding:none noted Location: bilat    Breast Assessment:  -Engorgement: N   --Redness/Plugged duct? N    -Mastitis? N     *ADDITIONAL DOCUMENTATION:  Referral as  indicated: PRN  **Educated on medications to take and to avoid while breastfeeding. Encouraged to refer to list given at discharge**

## 2025-01-30 ENCOUNTER — OFFICE VISIT (OUTPATIENT)
Dept: OBSTETRICS AND GYNECOLOGY | Facility: CLINIC | Age: 25
End: 2025-01-30
Payer: MEDICAID

## 2025-01-30 VITALS
SYSTOLIC BLOOD PRESSURE: 120 MMHG | TEMPERATURE: 97 F | BODY MASS INDEX: 34.84 KG/M2 | DIASTOLIC BLOOD PRESSURE: 74 MMHG | WEIGHT: 222 LBS | HEIGHT: 67 IN

## 2025-01-30 DIAGNOSIS — Z98.891 STATUS POST CESAREAN SECTION: ICD-10-CM

## 2025-01-30 DIAGNOSIS — Z30.9 ENCOUNTER FOR CONTRACEPTIVE MANAGEMENT, UNSPECIFIED TYPE: Primary | ICD-10-CM

## 2025-01-30 PROBLEM — D62 ACUTE BLOOD LOSS ANEMIA: Status: RESOLVED | Noted: 2024-10-30 | Resolved: 2025-01-30

## 2025-01-30 PROBLEM — O99.213 OBESITY AFFECTING PREGNANCY IN THIRD TRIMESTER: Status: RESOLVED | Noted: 2024-07-18 | Resolved: 2025-01-30

## 2025-01-30 PROBLEM — O09.90 AT HIGH RISK FOR COMPLICATIONS OF INTRAUTERINE PREGNANCY (IUP): Status: RESOLVED | Noted: 2024-07-31 | Resolved: 2025-01-30

## 2025-01-30 PROBLEM — Z13.31 POSITIVE DEPRESSION SCREENING: Status: RESOLVED | Noted: 2024-07-31 | Resolved: 2025-01-30

## 2025-01-30 PROBLEM — A59.01 TRICHOMONAL VAGINITIS DURING PREGNANCY IN THIRD TRIMESTER: Status: RESOLVED | Noted: 2024-08-19 | Resolved: 2025-01-30

## 2025-01-30 PROBLEM — O23.593 TRICHOMONAL VAGINITIS DURING PREGNANCY IN THIRD TRIMESTER: Status: RESOLVED | Noted: 2024-08-19 | Resolved: 2025-01-30

## 2025-01-30 PROCEDURE — 3008F BODY MASS INDEX DOCD: CPT | Mod: CPTII,,, | Performed by: OBSTETRICS & GYNECOLOGY

## 2025-01-30 PROCEDURE — 3074F SYST BP LT 130 MM HG: CPT | Mod: CPTII,,, | Performed by: OBSTETRICS & GYNECOLOGY

## 2025-01-30 PROCEDURE — 3078F DIAST BP <80 MM HG: CPT | Mod: CPTII,,, | Performed by: OBSTETRICS & GYNECOLOGY

## 2025-01-30 PROCEDURE — 99213 OFFICE O/P EST LOW 20 MIN: CPT | Mod: ,,, | Performed by: OBSTETRICS & GYNECOLOGY

## 2025-01-30 PROCEDURE — 1159F MED LIST DOCD IN RCRD: CPT | Mod: CPTII,,, | Performed by: OBSTETRICS & GYNECOLOGY

## 2025-01-30 RX ORDER — NORGESTIMATE AND ETHINYL ESTRADIOL 0.25-0.035
1 KIT ORAL DAILY
Qty: 30 TABLET | Refills: 11 | Status: SHIPPED | OUTPATIENT
Start: 2025-01-30 | End: 2026-01-30

## 2025-01-30 NOTE — PROGRESS NOTES
Subjective:       Anastasiia Altman is a 24 y.o. female  status post CS (10/29/24) presents for postpartum visit. Denies any complaints. Infant doing well, bottle feeding. Mood is stable, denies SI/HI. desires to discuss contraceptive management.     The patient's history was reviewed and updated.    Gyn History:    Menstrual History  Cycle: Yes (2025)  Menarche Age: 13 years  Flow Duration: 7  Flow: (!) Heavy  Interval: 28  Intermenstrual Bleeding: No  Dysmenorrhea: Yes  Dysmenorrhea Severity : Mild    Menopause  Menopause Age: 1 years    Pap History  Last pap date: 24  Result: Normal (+TV)  History of Abnormal Pap: No  HPV Vaccine Completed: Yes  HPV Vaccine Injection Type: 3 Injection Series    [place]  Sexually Active: Yes  Sexual Orientation: heterosexual  Postcoital Bleeding: No  Dyspareunia: No  STI History: Yes  STI Type: Trichomonas  Contraception: No    Breast History  Last Breast Imaging Date: No  History of Breast Biopsy: No          OB History    Para Term  AB Living   1 1 1     1   SAB IAB Ectopic Multiple Live Births         0 1      # Outcome Date GA Lbr Nabor/2nd Weight Sex Type Anes PTL Lv   1 Term 10/29/24 39w1d  3.5 kg (7 lb 11.5 oz) M CS-LTranv EPI  ANURAG         Review of Systems  General/Constitutional: Chills denies. Fatigue/weakness denies. Fever denies . Night sweats denies . Hot flashes denies  Gastrointestinal: Abdominal pain denies. Blood in stool denies. Constipation denies. Diarrhea denies. Heartburn denies. Nausea denies. Vomiting denies   Genitourinary: Incontinence denies. Blood in urine denies. Frequent urination denies.  Urgency denies. Painful urination denies. Nocturia denies   Gynecologic: Irregular menses denies.  Heavy bleeding  denies.  Painful menses denies.  Vaginal discharge denies. Vaginal odor denies. Vaginal itching/Irritation denies. Vaginal lesion denies.  Pelvic pain denies. Decreased libido denies. Vulvar lesion denies. Prolapse of  "genital organs denies. Painful intercourse denies. Postcoital bleeding denies   Psychiatric: Mood lability denies.  Depressed mood denies. Suicidal thoughts denies. Anxiety denies.  Overwhelmed denies.  Appetite normal. Energy level normal       Physical Exam:   /74 (BP Location: Left arm, Patient Position: Sitting)   Temp 96.8 °F (36 °C) (Temporal)   Ht 5' 7" (1.702 m)   Wt 100.7 kg (222 lb)   LMP 2025 (Exact Date)   Breastfeeding No   BMI 34.77 kg/m²      Chaperone present.      Constitutional: General appearance: healthy, well-nourished and well-developed   Psychiatric: Orientation to time, place and person. Normal mood and affect and active, alert   Breast: Inspection/palpation: no tenderness, masses, skin changes or abnormal secretions   Abdomen:      Auscultation/Inspection/Palpation: No tenderness or masses. Soft, nondistended      Inspection of incision site: well healed, clean, dry, intact and non-tender       Hernia: no palpable hernia     Female Genitalia:      Vulva: deferred per pt    Assessment:     1. Encounter for contraceptive management, unspecified type  -     norgestimate-ethinyl estradioL (ORTHO-CYCLEN) 0.25-35 mg-mcg per tablet; Take 1 tablet by mouth once daily.  Dispense: 30 tablet; Refill: 11    2. Status post  section           Plan:     Healthy diet and exercise  desires contraception at this time    Discussed with patient contraceptive options including natural family planning, barrier, oral contraceptives, patch, NuvaRing, Depo-Provera, Nexplanon, IUDs, abstinence.     Safe sex education given. Discussed with patient that birth control options discussed above do not protect against STDs.   Patient desires: OCP  Rx Sprintec   UPT in clinic today: negative     RTC prn/annual     This note was transcribed by Snow Amaya. There may be transcription errors as a result, however minimal. Effort has been made to ensure accuracy of transcription, but any obvious errors " or omissions should be clarified with the author of the document.       I agree with the above documentation.

## 2025-04-09 ENCOUNTER — TELEPHONE (OUTPATIENT)
Dept: OBSTETRICS AND GYNECOLOGY | Facility: CLINIC | Age: 25
End: 2025-04-09
Payer: MEDICAID

## 2025-04-09 NOTE — TELEPHONE ENCOUNTER
----- Message from Robyn sent at 4/9/2025  8:24 AM CDT -----  Regarding: Med refill  .Type:  RX Refill RequestWho Called:  patientRefill or New Rx: refill on Ortho-cyclen 0.25-35mg sent to [facility] in [facility] Call Back Number: [phone] Information:  called stating that she did not realize her pack was in her pocket and accidentally washed the pack- does take them on time and wanted to make sure she could get a refill today

## 2025-04-09 NOTE — TELEPHONE ENCOUNTER
Called pt and notified that she would need to contact her pharmacy and see if she could get an early refill ,if she cannot she would have to pay out of pocket for it.

## (undated) DEVICE — UNDERPAD ULTRASORB 300LB 30X36

## (undated) DEVICE — SUT MONOCRYL CT 0 36IN

## (undated) DEVICE — DRAPE CESAREAN 98X123.5X77 STR

## (undated) DEVICE — TOWEL OR DISP STRL BLUE 4/PK

## (undated) DEVICE — CHLORAPREP W TINT 26ML APPL

## (undated) DEVICE — SUT PDS II 96IN XLH TAPER

## (undated) DEVICE — SYR 10CC LUER LOCK

## (undated) DEVICE — TRAY CATH URETHRAL FOLEY 16FR

## (undated) DEVICE — PACK C-SECTION CUSTOM

## (undated) DEVICE — SUT VICRYL 3-0 CTX 36IN

## (undated) DEVICE — GLOVE PROTEXIS PI SYN SURG 8.5

## (undated) DEVICE — SUT CHROMIC 2-0 SH 27IN BRN

## (undated) DEVICE — ADHESIVE DERMABOND ADVANCED

## (undated) DEVICE — GOWN POLY REINF BRTH SLV 2XL

## (undated) DEVICE — PAD SURFACE PREP 17

## (undated) DEVICE — DRAPE HALF SURGICAL 40X58IN

## (undated) DEVICE — SUT 0 27IN CHROMIC GUT CT-1